# Patient Record
Sex: MALE | Race: WHITE | Employment: OTHER | URBAN - NONMETROPOLITAN AREA
[De-identification: names, ages, dates, MRNs, and addresses within clinical notes are randomized per-mention and may not be internally consistent; named-entity substitution may affect disease eponyms.]

---

## 2019-02-26 ENCOUNTER — HOSPITAL ENCOUNTER (OUTPATIENT)
Age: 64
Discharge: HOME | End: 2019-02-26
Payer: MEDICARE

## 2019-02-26 LAB
EKG ATRIAL RATE: 62 BPM
EKG P AXIS: 52 DEGREES
EKG P-R INTERVAL: 166 MS
EKG Q-T INTERVAL: 406 MS
EKG QRS DURATION: 84 MS
EKG QTC CALCULATION (BAZETT): 412 MS
EKG R AXIS: 54 DEGREES
EKG T AXIS: 57 DEGREES
EKG VENTRICULAR RATE: 62 BPM

## 2019-02-26 PROCEDURE — 93010 ELECTROCARDIOGRAM REPORT: CPT | Performed by: NUCLEAR MEDICINE

## 2019-02-26 PROCEDURE — 93005 ELECTROCARDIOGRAM TRACING: CPT | Performed by: FAMILY MEDICINE

## 2019-03-04 ENCOUNTER — OFFICE VISIT (OUTPATIENT)
Dept: CARDIOLOGY CLINIC | Age: 64
End: 2019-03-04
Payer: MEDICARE

## 2019-03-04 VITALS
HEIGHT: 70 IN | BODY MASS INDEX: 38.87 KG/M2 | HEART RATE: 62 BPM | WEIGHT: 271.5 LBS | SYSTOLIC BLOOD PRESSURE: 136 MMHG | DIASTOLIC BLOOD PRESSURE: 62 MMHG

## 2019-03-04 DIAGNOSIS — R06.00 DYSPNEA, UNSPECIFIED TYPE: Primary | ICD-10-CM

## 2019-03-04 DIAGNOSIS — I50.9 DYSPNEA DUE TO CONGESTIVE HEART FAILURE (HCC): ICD-10-CM

## 2019-03-04 PROCEDURE — G8417 CALC BMI ABV UP PARAM F/U: HCPCS | Performed by: INTERNAL MEDICINE

## 2019-03-04 PROCEDURE — 1123F ACP DISCUSS/DSCN MKR DOCD: CPT | Performed by: INTERNAL MEDICINE

## 2019-03-04 PROCEDURE — 1036F TOBACCO NON-USER: CPT | Performed by: INTERNAL MEDICINE

## 2019-03-04 PROCEDURE — 1101F PT FALLS ASSESS-DOCD LE1/YR: CPT | Performed by: INTERNAL MEDICINE

## 2019-03-04 PROCEDURE — 3017F COLORECTAL CA SCREEN DOC REV: CPT | Performed by: INTERNAL MEDICINE

## 2019-03-04 PROCEDURE — G8484 FLU IMMUNIZE NO ADMIN: HCPCS | Performed by: INTERNAL MEDICINE

## 2019-03-04 PROCEDURE — 99204 OFFICE O/P NEW MOD 45 MIN: CPT | Performed by: INTERNAL MEDICINE

## 2019-03-04 PROCEDURE — G8427 DOCREV CUR MEDS BY ELIG CLIN: HCPCS | Performed by: INTERNAL MEDICINE

## 2019-03-04 PROCEDURE — 4040F PNEUMOC VAC/ADMIN/RCVD: CPT | Performed by: INTERNAL MEDICINE

## 2019-03-04 RX ORDER — ATORVASTATIN CALCIUM 20 MG/1
20 TABLET, FILM COATED ORAL DAILY
Status: ON HOLD | COMMUNITY
End: 2019-03-12 | Stop reason: HOSPADM

## 2019-03-04 RX ORDER — AMLODIPINE BESYLATE AND BENAZEPRIL HYDROCHLORIDE 5; 10 MG/1; MG/1
1 CAPSULE ORAL DAILY
Status: ON HOLD | COMMUNITY
End: 2019-03-12 | Stop reason: HOSPADM

## 2019-03-05 ENCOUNTER — APPOINTMENT (OUTPATIENT)
Dept: GENERAL RADIOLOGY | Age: 64
DRG: 234 | End: 2019-03-05
Attending: INTERNAL MEDICINE
Payer: MEDICARE

## 2019-03-05 ENCOUNTER — APPOINTMENT (OUTPATIENT)
Dept: CARDIAC CATH/INVASIVE PROCEDURES | Age: 64
DRG: 234 | End: 2019-03-05
Attending: INTERNAL MEDICINE
Payer: MEDICARE

## 2019-03-05 ENCOUNTER — APPOINTMENT (OUTPATIENT)
Dept: CT IMAGING | Age: 64
DRG: 234 | End: 2019-03-05
Attending: INTERNAL MEDICINE
Payer: MEDICARE

## 2019-03-05 ENCOUNTER — APPOINTMENT (OUTPATIENT)
Dept: INTERVENTIONAL RADIOLOGY/VASCULAR | Age: 64
DRG: 234 | End: 2019-03-05
Attending: INTERNAL MEDICINE
Payer: MEDICARE

## 2019-03-05 PROBLEM — I25.10 CAD IN NATIVE ARTERY: Status: ACTIVE | Noted: 2019-03-05

## 2019-03-05 PROCEDURE — 93880 EXTRACRANIAL BILAT STUDY: CPT

## 2019-03-05 PROCEDURE — 71046 X-RAY EXAM CHEST 2 VIEWS: CPT

## 2019-03-05 PROCEDURE — 93971 EXTREMITY STUDY: CPT

## 2019-03-05 PROCEDURE — 71250 CT THORAX DX C-: CPT

## 2019-03-07 ENCOUNTER — ANESTHESIA EVENT (OUTPATIENT)
Dept: OPERATING ROOM | Age: 64
DRG: 234 | End: 2019-03-07
Payer: MEDICARE

## 2019-03-08 ENCOUNTER — ANESTHESIA (OUTPATIENT)
Dept: OPERATING ROOM | Age: 64
DRG: 234 | End: 2019-03-08
Payer: MEDICARE

## 2019-03-08 ENCOUNTER — APPOINTMENT (OUTPATIENT)
Dept: GENERAL RADIOLOGY | Age: 64
DRG: 234 | End: 2019-03-08
Attending: INTERNAL MEDICINE
Payer: MEDICARE

## 2019-03-08 VITALS
DIASTOLIC BLOOD PRESSURE: 70 MMHG | TEMPERATURE: 98.2 F | SYSTOLIC BLOOD PRESSURE: 121 MMHG | OXYGEN SATURATION: 99 % | RESPIRATION RATE: 10 BRPM

## 2019-03-08 PROBLEM — I21.4 NSTEMI (NON-ST ELEVATED MYOCARDIAL INFARCTION) (HCC): Status: ACTIVE | Noted: 2019-03-08

## 2019-03-08 PROCEDURE — 6370000000 HC RX 637 (ALT 250 FOR IP): Performed by: NURSE ANESTHETIST, CERTIFIED REGISTERED

## 2019-03-08 PROCEDURE — 2580000003 HC RX 258: Performed by: NURSE ANESTHETIST, CERTIFIED REGISTERED

## 2019-03-08 PROCEDURE — 6360000002 HC RX W HCPCS: Performed by: PHYSICIAN ASSISTANT

## 2019-03-08 PROCEDURE — P9045 ALBUMIN (HUMAN), 5%, 250 ML: HCPCS | Performed by: NURSE ANESTHETIST, CERTIFIED REGISTERED

## 2019-03-08 PROCEDURE — 6360000002 HC RX W HCPCS: Performed by: NURSE ANESTHETIST, CERTIFIED REGISTERED

## 2019-03-08 PROCEDURE — 71045 X-RAY EXAM CHEST 1 VIEW: CPT

## 2019-03-08 PROCEDURE — 2500000003 HC RX 250 WO HCPCS: Performed by: NURSE ANESTHETIST, CERTIFIED REGISTERED

## 2019-03-08 RX ORDER — CEFAZOLIN SODIUM 1 G/3ML
INJECTION, POWDER, FOR SOLUTION INTRAMUSCULAR; INTRAVENOUS PRN
Status: DISCONTINUED | OUTPATIENT
Start: 2019-03-08 | End: 2019-03-08 | Stop reason: SDUPTHER

## 2019-03-08 RX ORDER — FENTANYL CITRATE 50 UG/ML
INJECTION, SOLUTION INTRAMUSCULAR; INTRAVENOUS PRN
Status: DISCONTINUED | OUTPATIENT
Start: 2019-03-08 | End: 2019-03-08

## 2019-03-08 RX ORDER — FENTANYL CITRATE 50 UG/ML
INJECTION, SOLUTION INTRAMUSCULAR; INTRAVENOUS PRN
Status: DISCONTINUED | OUTPATIENT
Start: 2019-03-08 | End: 2019-03-08 | Stop reason: SDUPTHER

## 2019-03-08 RX ORDER — SODIUM CHLORIDE 9 MG/ML
INJECTION, SOLUTION INTRAVENOUS CONTINUOUS PRN
Status: DISCONTINUED | OUTPATIENT
Start: 2019-03-08 | End: 2019-03-08 | Stop reason: SDUPTHER

## 2019-03-08 RX ORDER — HEPARIN SODIUM 1000 [USP'U]/ML
INJECTION, SOLUTION INTRAVENOUS; SUBCUTANEOUS PRN
Status: DISCONTINUED | OUTPATIENT
Start: 2019-03-08 | End: 2019-03-08 | Stop reason: SDUPTHER

## 2019-03-08 RX ORDER — HYDROMORPHONE HCL 110MG/55ML
PATIENT CONTROLLED ANALGESIA SYRINGE INTRAVENOUS PRN
Status: DISCONTINUED | OUTPATIENT
Start: 2019-03-08 | End: 2019-03-08 | Stop reason: SDUPTHER

## 2019-03-08 RX ORDER — MIDAZOLAM HYDROCHLORIDE 1 MG/ML
INJECTION INTRAMUSCULAR; INTRAVENOUS PRN
Status: DISCONTINUED | OUTPATIENT
Start: 2019-03-08 | End: 2019-03-08 | Stop reason: SDUPTHER

## 2019-03-08 RX ORDER — PROTAMINE SULFATE 10 MG/ML
INJECTION, SOLUTION INTRAVENOUS PRN
Status: DISCONTINUED | OUTPATIENT
Start: 2019-03-08 | End: 2019-03-08 | Stop reason: SDUPTHER

## 2019-03-08 RX ORDER — PROPOFOL 10 MG/ML
INJECTION, EMULSION INTRAVENOUS PRN
Status: DISCONTINUED | OUTPATIENT
Start: 2019-03-08 | End: 2019-03-08 | Stop reason: SDUPTHER

## 2019-03-08 RX ORDER — TRANEXAMIC ACID 100 MG/ML
INJECTION, SOLUTION INTRAVENOUS PRN
Status: DISCONTINUED | OUTPATIENT
Start: 2019-03-08 | End: 2019-03-08 | Stop reason: SDUPTHER

## 2019-03-08 RX ORDER — ROCURONIUM BROMIDE 10 MG/ML
INJECTION, SOLUTION INTRAVENOUS PRN
Status: DISCONTINUED | OUTPATIENT
Start: 2019-03-08 | End: 2019-03-08 | Stop reason: SDUPTHER

## 2019-03-08 RX ORDER — ALBUMIN, HUMAN INJ 5% 5 %
SOLUTION INTRAVENOUS PRN
Status: DISCONTINUED | OUTPATIENT
Start: 2019-03-08 | End: 2019-03-08 | Stop reason: SDUPTHER

## 2019-03-08 RX ORDER — DEXMEDETOMIDINE HYDROCHLORIDE 4 UG/ML
INJECTION, SOLUTION INTRAVENOUS CONTINUOUS PRN
Status: DISCONTINUED | OUTPATIENT
Start: 2019-03-08 | End: 2019-03-08 | Stop reason: SDUPTHER

## 2019-03-08 RX ORDER — METOPROLOL TARTRATE 5 MG/5ML
INJECTION INTRAVENOUS PRN
Status: DISCONTINUED | OUTPATIENT
Start: 2019-03-08 | End: 2019-03-08 | Stop reason: SDUPTHER

## 2019-03-08 RX ORDER — LIDOCAINE HYDROCHLORIDE 20 MG/ML
INJECTION, SOLUTION INFILTRATION; PERINEURAL PRN
Status: DISCONTINUED | OUTPATIENT
Start: 2019-03-08 | End: 2019-03-08 | Stop reason: SDUPTHER

## 2019-03-08 RX ADMIN — PROTAMINE SULFATE 375 MG: 10 INJECTION, SOLUTION INTRAVENOUS at 12:17

## 2019-03-08 RX ADMIN — HEPARIN SODIUM 5000 UNITS: 1000 INJECTION, SOLUTION INTRAVENOUS; SUBCUTANEOUS at 10:01

## 2019-03-08 RX ADMIN — METOPROLOL TARTRATE 1 MG: 5 INJECTION, SOLUTION INTRAVENOUS at 09:10

## 2019-03-08 RX ADMIN — Medication 2 UNITS: at 08:36

## 2019-03-08 RX ADMIN — HYDROMORPHONE HYDROCHLORIDE 0.4 MG: 2 INJECTION INTRAMUSCULAR; INTRAVENOUS; SUBCUTANEOUS at 09:47

## 2019-03-08 RX ADMIN — ROCURONIUM BROMIDE 100 MG: 10 INJECTION INTRAVENOUS at 07:53

## 2019-03-08 RX ADMIN — PROPOFOL 150 MG: 10 INJECTION, EMULSION INTRAVENOUS at 07:53

## 2019-03-08 RX ADMIN — CEFAZOLIN 1500 MG: 1 INJECTION, POWDER, FOR SOLUTION INTRAMUSCULAR; INTRAVENOUS; PARENTERAL at 12:20

## 2019-03-08 RX ADMIN — ROCURONIUM BROMIDE 50 MG: 10 INJECTION INTRAVENOUS at 10:00

## 2019-03-08 RX ADMIN — Medication 2 UNITS/HR: at 08:30

## 2019-03-08 RX ADMIN — FENTANYL CITRATE 50 MCG: 50 INJECTION INTRAMUSCULAR; INTRAVENOUS at 08:23

## 2019-03-08 RX ADMIN — FENTANYL CITRATE 100 MCG: 50 INJECTION INTRAMUSCULAR; INTRAVENOUS at 08:35

## 2019-03-08 RX ADMIN — EPINEPHRINE 4 MCG/MIN: 1 INJECTION, SOLUTION INTRAMUSCULAR; INTRAVENOUS; SUBCUTANEOUS at 11:55

## 2019-03-08 RX ADMIN — MIDAZOLAM HYDROCHLORIDE 2 MG: 1 INJECTION, SOLUTION INTRAMUSCULAR; INTRAVENOUS at 11:50

## 2019-03-08 RX ADMIN — Medication 4 UNITS: at 12:30

## 2019-03-08 RX ADMIN — Medication 3 G: at 08:10

## 2019-03-08 RX ADMIN — TRANEXAMIC ACID 600 MG: 100 INJECTION, SOLUTION INTRAVENOUS at 12:17

## 2019-03-08 RX ADMIN — TRANEXAMIC ACID 1200 MG: 100 INJECTION, SOLUTION INTRAVENOUS at 09:30

## 2019-03-08 RX ADMIN — HEPARIN SODIUM 5000 UNITS: 1000 INJECTION, SOLUTION INTRAVENOUS; SUBCUTANEOUS at 08:39

## 2019-03-08 RX ADMIN — HYDROMORPHONE HYDROCHLORIDE 0.6 MG: 2 INJECTION INTRAMUSCULAR; INTRAVENOUS; SUBCUTANEOUS at 09:55

## 2019-03-08 RX ADMIN — FENTANYL CITRATE 100 MCG: 50 INJECTION INTRAMUSCULAR; INTRAVENOUS at 07:53

## 2019-03-08 RX ADMIN — MIDAZOLAM HYDROCHLORIDE 2 MG: 1 INJECTION, SOLUTION INTRAMUSCULAR; INTRAVENOUS at 07:53

## 2019-03-08 RX ADMIN — HYDROMORPHONE HYDROCHLORIDE 0.2 MG: 2 INJECTION INTRAMUSCULAR; INTRAVENOUS; SUBCUTANEOUS at 12:19

## 2019-03-08 RX ADMIN — HEPARIN SODIUM 35000 UNITS: 1000 INJECTION, SOLUTION INTRAVENOUS; SUBCUTANEOUS at 09:30

## 2019-03-08 RX ADMIN — METOPROLOL TARTRATE 2 MG: 5 INJECTION, SOLUTION INTRAVENOUS at 08:32

## 2019-03-08 RX ADMIN — METOPROLOL TARTRATE 2 MG: 5 INJECTION, SOLUTION INTRAVENOUS at 08:55

## 2019-03-08 RX ADMIN — HYDROMORPHONE HYDROCHLORIDE 0.4 MG: 2 INJECTION INTRAMUSCULAR; INTRAVENOUS; SUBCUTANEOUS at 12:50

## 2019-03-08 RX ADMIN — MIDAZOLAM HYDROCHLORIDE 1 MG: 1 INJECTION, SOLUTION INTRAMUSCULAR; INTRAVENOUS at 12:55

## 2019-03-08 RX ADMIN — DEXMEDETOMIDINE HYDROCHLORIDE 0.5 MCG/KG/HR: 4 INJECTION, SOLUTION INTRAVENOUS at 11:55

## 2019-03-08 RX ADMIN — SODIUM CHLORIDE: 9 INJECTION, SOLUTION INTRAVENOUS at 07:53

## 2019-03-08 RX ADMIN — ALBUMIN (HUMAN) 250 ML: 12.5 INJECTION, SOLUTION INTRAVENOUS at 12:39

## 2019-03-08 RX ADMIN — LIDOCAINE HYDROCHLORIDE 50 MG: 20 INJECTION, SOLUTION INFILTRATION; PERINEURAL at 07:53

## 2019-03-08 RX ADMIN — HYDROMORPHONE HYDROCHLORIDE 0.4 MG: 2 INJECTION INTRAMUSCULAR; INTRAVENOUS; SUBCUTANEOUS at 13:02

## 2019-03-08 RX ADMIN — Medication 5 UNITS: at 11:50

## 2019-03-08 ASSESSMENT — PULMONARY FUNCTION TESTS
PIF_VALUE: 22
PIF_VALUE: 22
PIF_VALUE: 25
PIF_VALUE: 26
PIF_VALUE: 26
PIF_VALUE: 21
PIF_VALUE: 1
PIF_VALUE: 24
PIF_VALUE: 1
PIF_VALUE: 27
PIF_VALUE: 26
PIF_VALUE: 2
PIF_VALUE: 25
PIF_VALUE: 20
PIF_VALUE: 21
PIF_VALUE: 24
PIF_VALUE: 1
PIF_VALUE: 22
PIF_VALUE: 22
PIF_VALUE: 24
PIF_VALUE: 26
PIF_VALUE: 17
PIF_VALUE: 21
PIF_VALUE: 28
PIF_VALUE: 26
PIF_VALUE: 23
PIF_VALUE: 1
PIF_VALUE: 22
PIF_VALUE: 21
PIF_VALUE: 26
PIF_VALUE: 21
PIF_VALUE: 21
PIF_VALUE: 27
PIF_VALUE: 20
PIF_VALUE: 22
PIF_VALUE: 26
PIF_VALUE: 1
PIF_VALUE: 21
PIF_VALUE: 26
PIF_VALUE: 1
PIF_VALUE: 22
PIF_VALUE: 25
PIF_VALUE: 21
PIF_VALUE: 26
PIF_VALUE: 26
PIF_VALUE: 27
PIF_VALUE: 22
PIF_VALUE: 1
PIF_VALUE: 1
PIF_VALUE: 23
PIF_VALUE: 23
PIF_VALUE: 21
PIF_VALUE: 21
PIF_VALUE: 1
PIF_VALUE: 20
PIF_VALUE: 24
PIF_VALUE: 22
PIF_VALUE: 21
PIF_VALUE: 26
PIF_VALUE: 1
PIF_VALUE: 24
PIF_VALUE: 23
PIF_VALUE: 23
PIF_VALUE: 28
PIF_VALUE: 24
PIF_VALUE: 21
PIF_VALUE: 24
PIF_VALUE: 1
PIF_VALUE: 1
PIF_VALUE: 26
PIF_VALUE: 24
PIF_VALUE: 1
PIF_VALUE: 21
PIF_VALUE: 22
PIF_VALUE: 21
PIF_VALUE: 2
PIF_VALUE: 22
PIF_VALUE: 27
PIF_VALUE: 1
PIF_VALUE: 22
PIF_VALUE: 21
PIF_VALUE: 23
PIF_VALUE: 20
PIF_VALUE: 26
PIF_VALUE: 1
PIF_VALUE: 26
PIF_VALUE: 22
PIF_VALUE: 27
PIF_VALUE: 22
PIF_VALUE: 1
PIF_VALUE: 24
PIF_VALUE: 21
PIF_VALUE: 1
PIF_VALUE: 1
PIF_VALUE: 20
PIF_VALUE: 27
PIF_VALUE: 1
PIF_VALUE: 24
PIF_VALUE: 21
PIF_VALUE: 26
PIF_VALUE: 1
PIF_VALUE: 22
PIF_VALUE: 20
PIF_VALUE: 24
PIF_VALUE: 26
PIF_VALUE: 1
PIF_VALUE: 20
PIF_VALUE: 22
PIF_VALUE: 26
PIF_VALUE: 1
PIF_VALUE: 24
PIF_VALUE: 27
PIF_VALUE: 1
PIF_VALUE: 28
PIF_VALUE: 21
PIF_VALUE: 1
PIF_VALUE: 28
PIF_VALUE: 28
PIF_VALUE: 29
PIF_VALUE: 23
PIF_VALUE: 23
PIF_VALUE: 1
PIF_VALUE: 24
PIF_VALUE: 1
PIF_VALUE: 21
PIF_VALUE: 1
PIF_VALUE: 23
PIF_VALUE: 23
PIF_VALUE: 22
PIF_VALUE: 21
PIF_VALUE: 1
PIF_VALUE: 23
PIF_VALUE: 21
PIF_VALUE: 26
PIF_VALUE: 22
PIF_VALUE: 24
PIF_VALUE: 1
PIF_VALUE: 22
PIF_VALUE: 27
PIF_VALUE: 1
PIF_VALUE: 23
PIF_VALUE: 1
PIF_VALUE: 17
PIF_VALUE: 23
PIF_VALUE: 22
PIF_VALUE: 23
PIF_VALUE: 26
PIF_VALUE: 1
PIF_VALUE: 1
PIF_VALUE: 23
PIF_VALUE: 22
PIF_VALUE: 22
PIF_VALUE: 27
PIF_VALUE: 24
PIF_VALUE: 21
PIF_VALUE: 24
PIF_VALUE: 1
PIF_VALUE: 26
PIF_VALUE: 27
PIF_VALUE: 21
PIF_VALUE: 29
PIF_VALUE: 1
PIF_VALUE: 24
PIF_VALUE: 26
PIF_VALUE: 0
PIF_VALUE: 28
PIF_VALUE: 26
PIF_VALUE: 24
PIF_VALUE: 24
PIF_VALUE: 1
PIF_VALUE: 1
PIF_VALUE: 22
PIF_VALUE: 23
PIF_VALUE: 0
PIF_VALUE: 1
PIF_VALUE: 27
PIF_VALUE: 1
PIF_VALUE: 23
PIF_VALUE: 21
PIF_VALUE: 24
PIF_VALUE: 1
PIF_VALUE: 21
PIF_VALUE: 26
PIF_VALUE: 23
PIF_VALUE: 24
PIF_VALUE: 2
PIF_VALUE: 21
PIF_VALUE: 20
PIF_VALUE: 21
PIF_VALUE: 26
PIF_VALUE: 1
PIF_VALUE: 24
PIF_VALUE: 26
PIF_VALUE: 23
PIF_VALUE: 21
PIF_VALUE: 21
PIF_VALUE: 1
PIF_VALUE: 21
PIF_VALUE: 1
PIF_VALUE: 26
PIF_VALUE: 26
PIF_VALUE: 22
PIF_VALUE: 21
PIF_VALUE: 1
PIF_VALUE: 21
PIF_VALUE: 25
PIF_VALUE: 24
PIF_VALUE: 31
PIF_VALUE: 1
PIF_VALUE: 1
PIF_VALUE: 24
PIF_VALUE: 23
PIF_VALUE: 24
PIF_VALUE: 23
PIF_VALUE: 23
PIF_VALUE: 26
PIF_VALUE: 20
PIF_VALUE: 24
PIF_VALUE: 1
PIF_VALUE: 21
PIF_VALUE: 20
PIF_VALUE: 1
PIF_VALUE: 26
PIF_VALUE: 21
PIF_VALUE: 1
PIF_VALUE: 23
PIF_VALUE: 26
PIF_VALUE: 25
PIF_VALUE: 22
PIF_VALUE: 1
PIF_VALUE: 22
PIF_VALUE: 20
PIF_VALUE: 22
PIF_VALUE: 1
PIF_VALUE: 26
PIF_VALUE: 22
PIF_VALUE: 1
PIF_VALUE: 23
PIF_VALUE: 1
PIF_VALUE: 1
PIF_VALUE: 27
PIF_VALUE: 21
PIF_VALUE: 24
PIF_VALUE: 26
PIF_VALUE: 28
PIF_VALUE: 22
PIF_VALUE: 20
PIF_VALUE: 21
PIF_VALUE: 21
PIF_VALUE: 18
PIF_VALUE: 27
PIF_VALUE: 22
PIF_VALUE: 26
PIF_VALUE: 1
PIF_VALUE: 29
PIF_VALUE: 22
PIF_VALUE: 1
PIF_VALUE: 21
PIF_VALUE: 25
PIF_VALUE: 23
PIF_VALUE: 1
PIF_VALUE: 28
PIF_VALUE: 1
PIF_VALUE: 26
PIF_VALUE: 22
PIF_VALUE: 24
PIF_VALUE: 1
PIF_VALUE: 23
PIF_VALUE: 1
PIF_VALUE: 23
PIF_VALUE: 24
PIF_VALUE: 22
PIF_VALUE: 23
PIF_VALUE: 21
PIF_VALUE: 1
PIF_VALUE: 25
PIF_VALUE: 27
PIF_VALUE: 25
PIF_VALUE: 24
PIF_VALUE: 25
PIF_VALUE: 23
PIF_VALUE: 22
PIF_VALUE: 17
PIF_VALUE: 28
PIF_VALUE: 22
PIF_VALUE: 22
PIF_VALUE: 1
PIF_VALUE: 27
PIF_VALUE: 1
PIF_VALUE: 25
PIF_VALUE: 1
PIF_VALUE: 23
PIF_VALUE: 1
PIF_VALUE: 22
PIF_VALUE: 24
PIF_VALUE: 1
PIF_VALUE: 1
PIF_VALUE: 26
PIF_VALUE: 26
PIF_VALUE: 19
PIF_VALUE: 26
PIF_VALUE: 1
PIF_VALUE: 26
PIF_VALUE: 21
PIF_VALUE: 21
PIF_VALUE: 22
PIF_VALUE: 28

## 2019-03-09 ENCOUNTER — APPOINTMENT (OUTPATIENT)
Dept: GENERAL RADIOLOGY | Age: 64
DRG: 234 | End: 2019-03-09
Attending: INTERNAL MEDICINE
Payer: MEDICARE

## 2019-03-09 PROCEDURE — 71045 X-RAY EXAM CHEST 1 VIEW: CPT

## 2019-03-10 ENCOUNTER — APPOINTMENT (OUTPATIENT)
Dept: GENERAL RADIOLOGY | Age: 64
DRG: 234 | End: 2019-03-10
Attending: INTERNAL MEDICINE
Payer: MEDICARE

## 2019-03-10 PROCEDURE — 71045 X-RAY EXAM CHEST 1 VIEW: CPT

## 2019-03-11 ENCOUNTER — APPOINTMENT (OUTPATIENT)
Dept: GENERAL RADIOLOGY | Age: 64
DRG: 234 | End: 2019-03-11
Attending: INTERNAL MEDICINE
Payer: MEDICARE

## 2019-03-11 PROCEDURE — 71046 X-RAY EXAM CHEST 2 VIEWS: CPT

## 2019-03-12 ENCOUNTER — APPOINTMENT (OUTPATIENT)
Dept: GENERAL RADIOLOGY | Age: 64
DRG: 234 | End: 2019-03-12
Attending: INTERNAL MEDICINE
Payer: MEDICARE

## 2019-03-12 PROCEDURE — 71045 X-RAY EXAM CHEST 1 VIEW: CPT

## 2019-03-26 ENCOUNTER — HOSPITAL ENCOUNTER (INPATIENT)
Age: 64
LOS: 1 days | Discharge: HOME | DRG: 299 | End: 2019-03-27
Attending: FAMILY MEDICINE | Admitting: INTERNAL MEDICINE
Payer: MEDICARE

## 2019-03-26 ENCOUNTER — APPOINTMENT (OUTPATIENT)
Dept: CT IMAGING | Age: 64
DRG: 299 | End: 2019-03-26
Payer: MEDICARE

## 2019-03-26 ENCOUNTER — APPOINTMENT (OUTPATIENT)
Dept: INTERVENTIONAL RADIOLOGY/VASCULAR | Age: 64
DRG: 299 | End: 2019-03-26
Payer: MEDICARE

## 2019-03-26 ENCOUNTER — TELEPHONE (OUTPATIENT)
Dept: CARDIOTHORACIC SURGERY | Age: 64
End: 2019-03-26

## 2019-03-26 ENCOUNTER — APPOINTMENT (OUTPATIENT)
Dept: GENERAL RADIOLOGY | Age: 64
DRG: 299 | End: 2019-03-26
Payer: MEDICARE

## 2019-03-26 DIAGNOSIS — I21.4 NSTEMI (NON-ST ELEVATED MYOCARDIAL INFARCTION) (HCC): ICD-10-CM

## 2019-03-26 DIAGNOSIS — I82.402 LEG DVT (DEEP VENOUS THROMBOEMBOLISM), ACUTE, LEFT (HCC): Primary | ICD-10-CM

## 2019-03-26 DIAGNOSIS — I25.10 CAD IN NATIVE ARTERY: ICD-10-CM

## 2019-03-26 DIAGNOSIS — R91.1 NODULE OF RIGHT LUNG: ICD-10-CM

## 2019-03-26 PROBLEM — I82.4Z2 LOWER LEG DVT (DEEP VENOUS THROMBOEMBOLISM), ACUTE, LEFT (HCC): Status: ACTIVE | Noted: 2019-03-26

## 2019-03-26 LAB
ALBUMIN SERPL-MCNC: 3.1 G/DL (ref 3.5–5.1)
ALP BLD-CCNC: 201 U/L (ref 38–126)
ALT SERPL-CCNC: 25 U/L (ref 11–66)
ANION GAP SERPL CALCULATED.3IONS-SCNC: 10 MEQ/L (ref 8–16)
APTT: 34.5 SECONDS (ref 22–38)
AST SERPL-CCNC: 22 U/L (ref 5–40)
BASOPHILS # BLD: 0.4 %
BASOPHILS ABSOLUTE: 0 THOU/MM3 (ref 0–0.1)
BILIRUB SERPL-MCNC: 0.6 MG/DL (ref 0.3–1.2)
BUN BLDV-MCNC: 18 MG/DL (ref 7–22)
CALCIUM SERPL-MCNC: 8.6 MG/DL (ref 8.5–10.5)
CHLORIDE BLD-SCNC: 105 MEQ/L (ref 98–111)
CO2: 26 MEQ/L (ref 23–33)
CREAT SERPL-MCNC: 1 MG/DL (ref 0.4–1.2)
EOSINOPHIL # BLD: 2.9 %
EOSINOPHILS ABSOLUTE: 0.2 THOU/MM3 (ref 0–0.4)
ERYTHROCYTE [DISTWIDTH] IN BLOOD BY AUTOMATED COUNT: 13.7 % (ref 11.5–14.5)
ERYTHROCYTE [DISTWIDTH] IN BLOOD BY AUTOMATED COUNT: 45.8 FL (ref 35–45)
GFR SERPL CREATININE-BSD FRML MDRD: 75 ML/MIN/1.73M2
GLUCOSE BLD-MCNC: 127 MG/DL (ref 70–108)
HCT VFR BLD CALC: 37.3 % (ref 42–52)
HEMOGLOBIN: 11.9 GM/DL (ref 14–18)
IMMATURE GRANS (ABS): 0.04 THOU/MM3 (ref 0–0.07)
IMMATURE GRANULOCYTES: 0.5 %
INR BLD: 1.14 (ref 0.85–1.13)
LYMPHOCYTES # BLD: 21.6 %
LYMPHOCYTES ABSOLUTE: 1.7 THOU/MM3 (ref 1–4.8)
MCH RBC QN AUTO: 29.4 PG (ref 26–33)
MCHC RBC AUTO-ENTMCNC: 31.9 GM/DL (ref 32.2–35.5)
MCV RBC AUTO: 92.1 FL (ref 80–94)
MONOCYTES # BLD: 8.5 %
MONOCYTES ABSOLUTE: 0.7 THOU/MM3 (ref 0.4–1.3)
NUCLEATED RED BLOOD CELLS: 0 /100 WBC
OSMOLALITY CALCULATION: 284.7 MOSMOL/KG (ref 275–300)
PLATELET # BLD: 291 THOU/MM3 (ref 130–400)
PMV BLD AUTO: 9.2 FL (ref 9.4–12.4)
POTASSIUM REFLEX MAGNESIUM: 4.5 MEQ/L (ref 3.5–5.2)
PRO-BNP: 716.9 PG/ML (ref 0–900)
RBC # BLD: 4.05 MILL/MM3 (ref 4.7–6.1)
SEG NEUTROPHILS: 66.1 %
SEGMENTED NEUTROPHILS ABSOLUTE COUNT: 5.1 THOU/MM3 (ref 1.8–7.7)
SODIUM BLD-SCNC: 141 MEQ/L (ref 135–145)
TOTAL PROTEIN: 6.9 G/DL (ref 6.1–8)
TROPONIN T: < 0.01 NG/ML
TROPONIN T: < 0.01 NG/ML
WBC # BLD: 7.7 THOU/MM3 (ref 4.8–10.8)

## 2019-03-26 PROCEDURE — 36415 COLL VENOUS BLD VENIPUNCTURE: CPT

## 2019-03-26 PROCEDURE — 87500 VANOMYCIN DNA AMP PROBE: CPT

## 2019-03-26 PROCEDURE — 2709999900 HC NON-CHARGEABLE SUPPLY

## 2019-03-26 PROCEDURE — 6360000002 HC RX W HCPCS: Performed by: FAMILY MEDICINE

## 2019-03-26 PROCEDURE — 6370000000 HC RX 637 (ALT 250 FOR IP): Performed by: INTERNAL MEDICINE

## 2019-03-26 PROCEDURE — 80053 COMPREHEN METABOLIC PANEL: CPT

## 2019-03-26 PROCEDURE — 2060000000 HC ICU INTERMEDIATE R&B

## 2019-03-26 PROCEDURE — 85025 COMPLETE CBC W/AUTO DIFF WBC: CPT

## 2019-03-26 PROCEDURE — 93971 EXTREMITY STUDY: CPT

## 2019-03-26 PROCEDURE — 71046 X-RAY EXAM CHEST 2 VIEWS: CPT

## 2019-03-26 PROCEDURE — 85730 THROMBOPLASTIN TIME PARTIAL: CPT

## 2019-03-26 PROCEDURE — 87081 CULTURE SCREEN ONLY: CPT

## 2019-03-26 PROCEDURE — 99285 EMERGENCY DEPT VISIT HI MDM: CPT

## 2019-03-26 PROCEDURE — 96365 THER/PROPH/DIAG IV INF INIT: CPT

## 2019-03-26 PROCEDURE — 84484 ASSAY OF TROPONIN QUANT: CPT

## 2019-03-26 PROCEDURE — 85610 PROTHROMBIN TIME: CPT

## 2019-03-26 PROCEDURE — 83880 ASSAY OF NATRIURETIC PEPTIDE: CPT

## 2019-03-26 PROCEDURE — 87641 MR-STAPH DNA AMP PROBE: CPT

## 2019-03-26 PROCEDURE — 93005 ELECTROCARDIOGRAM TRACING: CPT | Performed by: FAMILY MEDICINE

## 2019-03-26 PROCEDURE — 99223 1ST HOSP IP/OBS HIGH 75: CPT | Performed by: INTERNAL MEDICINE

## 2019-03-26 PROCEDURE — 71275 CT ANGIOGRAPHY CHEST: CPT

## 2019-03-26 PROCEDURE — 6360000004 HC RX CONTRAST MEDICATION: Performed by: FAMILY MEDICINE

## 2019-03-26 RX ORDER — HEPARIN SODIUM 1000 [USP'U]/ML
80 INJECTION, SOLUTION INTRAVENOUS; SUBCUTANEOUS PRN
Status: DISCONTINUED | OUTPATIENT
Start: 2019-03-26 | End: 2019-03-27

## 2019-03-26 RX ORDER — SODIUM CHLORIDE 0.9 % (FLUSH) 0.9 %
10 SYRINGE (ML) INJECTION EVERY 12 HOURS SCHEDULED
Status: DISCONTINUED | OUTPATIENT
Start: 2019-03-26 | End: 2019-03-27 | Stop reason: HOSPADM

## 2019-03-26 RX ORDER — HEPARIN SODIUM 1000 [USP'U]/ML
40 INJECTION, SOLUTION INTRAVENOUS; SUBCUTANEOUS PRN
Status: DISCONTINUED | OUTPATIENT
Start: 2019-03-26 | End: 2019-03-27

## 2019-03-26 RX ORDER — ACETAMINOPHEN 325 MG/1
650 TABLET ORAL EVERY 4 HOURS PRN
Status: DISCONTINUED | OUTPATIENT
Start: 2019-03-26 | End: 2019-03-27 | Stop reason: HOSPADM

## 2019-03-26 RX ORDER — ONDANSETRON 2 MG/ML
4 INJECTION INTRAMUSCULAR; INTRAVENOUS EVERY 6 HOURS PRN
Status: DISCONTINUED | OUTPATIENT
Start: 2019-03-26 | End: 2019-03-27 | Stop reason: HOSPADM

## 2019-03-26 RX ORDER — HEPARIN SODIUM 10000 [USP'U]/100ML
18 INJECTION, SOLUTION INTRAVENOUS CONTINUOUS
Status: DISCONTINUED | OUTPATIENT
Start: 2019-03-26 | End: 2019-03-27

## 2019-03-26 RX ORDER — HEPARIN SODIUM 1000 [USP'U]/ML
80 INJECTION, SOLUTION INTRAVENOUS; SUBCUTANEOUS ONCE
Status: COMPLETED | OUTPATIENT
Start: 2019-03-26 | End: 2019-03-26

## 2019-03-26 RX ORDER — POTASSIUM CHLORIDE 20 MEQ/1
20 TABLET, EXTENDED RELEASE ORAL 2 TIMES DAILY
Status: DISCONTINUED | OUTPATIENT
Start: 2019-03-26 | End: 2019-03-27 | Stop reason: HOSPADM

## 2019-03-26 RX ORDER — CLOPIDOGREL BISULFATE 75 MG/1
75 TABLET ORAL DAILY
Status: DISCONTINUED | OUTPATIENT
Start: 2019-03-27 | End: 2019-03-27

## 2019-03-26 RX ORDER — AMIODARONE HYDROCHLORIDE 200 MG/1
200 TABLET ORAL DAILY
Status: DISCONTINUED | OUTPATIENT
Start: 2019-03-27 | End: 2019-03-27

## 2019-03-26 RX ORDER — SODIUM CHLORIDE 0.9 % (FLUSH) 0.9 %
10 SYRINGE (ML) INJECTION PRN
Status: DISCONTINUED | OUTPATIENT
Start: 2019-03-26 | End: 2019-03-27 | Stop reason: HOSPADM

## 2019-03-26 RX ORDER — FUROSEMIDE 40 MG/1
40 TABLET ORAL DAILY
Status: DISCONTINUED | OUTPATIENT
Start: 2019-03-27 | End: 2019-03-27 | Stop reason: HOSPADM

## 2019-03-26 RX ORDER — ATORVASTATIN CALCIUM 40 MG/1
40 TABLET, FILM COATED ORAL NIGHTLY
Status: DISCONTINUED | OUTPATIENT
Start: 2019-03-26 | End: 2019-03-27 | Stop reason: HOSPADM

## 2019-03-26 RX ADMIN — ATORVASTATIN CALCIUM 40 MG: 40 TABLET, FILM COATED ORAL at 23:18

## 2019-03-26 RX ADMIN — POTASSIUM CHLORIDE 20 MEQ: 20 TABLET, EXTENDED RELEASE ORAL at 23:18

## 2019-03-26 RX ADMIN — HEPARIN SODIUM 9290 UNITS: 1000 INJECTION INTRAVENOUS; SUBCUTANEOUS at 20:29

## 2019-03-26 RX ADMIN — IOPAMIDOL 80 ML: 755 INJECTION, SOLUTION INTRAVENOUS at 21:31

## 2019-03-26 RX ADMIN — HEPARIN SODIUM 18 UNITS/KG/HR: 10000 INJECTION, SOLUTION INTRAVENOUS at 20:29

## 2019-03-26 ASSESSMENT — PAIN DESCRIPTION - FREQUENCY: FREQUENCY: CONTINUOUS

## 2019-03-26 ASSESSMENT — ENCOUNTER SYMPTOMS
BACK PAIN: 0
COUGH: 0
ABDOMINAL PAIN: 0
EYE REDNESS: 0
EYE DISCHARGE: 0
NAUSEA: 0
WHEEZING: 0
SHORTNESS OF BREATH: 0
RHINORRHEA: 0
DIARRHEA: 0
VOMITING: 0
SORE THROAT: 0

## 2019-03-26 ASSESSMENT — PAIN SCALES - GENERAL
PAINLEVEL_OUTOF10: 0
PAINLEVEL_OUTOF10: 5

## 2019-03-26 ASSESSMENT — PAIN DESCRIPTION - LOCATION: LOCATION: LEG

## 2019-03-26 ASSESSMENT — PAIN DESCRIPTION - PAIN TYPE: TYPE: ACUTE PAIN

## 2019-03-26 ASSESSMENT — PAIN DESCRIPTION - ORIENTATION: ORIENTATION: LEFT

## 2019-03-26 ASSESSMENT — PAIN DESCRIPTION - DESCRIPTORS: DESCRIPTORS: ACHING

## 2019-03-26 NOTE — ED PROVIDER NOTES
[facility]  eMERGENCY dEPARTMENT eNCOUnter          [address] [facility]       Chief Complaint   Patient presents with    Leg Pain     left       Nurses Notes reviewed and I agree except as noted inthe HPI. HISTORY OF PRESENT ILLNESS    Harshil Russo is a 75 y.o. male who presents to the Emergency Department for the evaluation of left leg pain. The patient has a history of CAD, CABG, NSTEMI, and hypertension. His open heart surgery was performed on 3/05/2019 by Dr. Aracely Moran. Patient developed this left calf pain 3 to 4 days ago. He reports here with concern of DVT. He is on both Plavix and Aspirin. He rates his current pain at a 5/10 in severity. He describes the pain as constant and aching in character. He has not noticed any swelling. He denies chest pain or shortness of breath. No cough or congestion. Patient reports an occasional cough which is chronic. He denies nausea, vomiting, fever, chills, or abdominal pain. Wife is at bedside. Patient is resting comfortably. There are no other complaints or symptoms. The HPI was provided by the patient. REVIEW OF SYSTEMS     Review of Systems   Constitutional: Negative for appetite change, chills, fatigue and fever. HENT: Negative for congestion, ear pain, rhinorrhea and sore throat. Eyes: Negative for discharge, redness and visual disturbance. Respiratory: Negative for cough, shortness of breath and wheezing. Cardiovascular: Negative for chest pain, palpitations and leg swelling. Gastrointestinal: Negative for abdominal pain, diarrhea, nausea and vomiting. Genitourinary: Negative for decreased urine volume, difficulty urinating, dysuria and hematuria. Musculoskeletal: Negative for back pain, joint swelling and neck pain. Left calf pain- concern of DVT   Skin: Negative for pallor and rash. Allergic/Immunologic: Negative for environmental allergies.    Neurological: Negative for dizziness, syncope, weakness, light-headedness, numbness and headaches. Hematological: Negative for adenopathy. Psychiatric/Behavioral: Negative for confusion and suicidal ideas. The patient is not nervous/anxious. PAST MEDICAL HISTORY    has no past medical history on file. SURGICAL HISTORY      has a past surgical history that includes Umbilical hernia repair; lipoma resection; and Coronary artery bypass graft (N/A, 3/8/2019). CURRENT MEDICATIONS       Previous Medications    ACETAMINOPHEN (TYLENOL) 325 MG TABLET    Take 2 tablets by mouth every 4 hours as needed for Pain    AMIODARONE (CORDARONE) 200 MG TABLET    Take 1 tablet by mouth daily    ATORVASTATIN (LIPITOR) 20 MG TABLET    Take 2 tablets by mouth nightly    CLOPIDOGREL (PLAVIX) 75 MG TABLET    Take 1 tablet by mouth daily    FUROSEMIDE (LASIX) 40 MG TABLET    Take 1 tablet by mouth daily    METOPROLOL TARTRATE (LOPRESSOR) 25 MG TABLET    Take 1 tablet by mouth 2 times daily    NITROGLYCERIN (NITROSTAT) 0.4 MG SL TABLET    up to max of 3 total doses. If no relief after 1 dose, call 911. POTASSIUM CHLORIDE (KLOR-CON M) 20 MEQ EXTENDED RELEASE TABLET    Take 1 tablet by mouth 2 times daily       ALLERGIES     has No Known Allergies. FAMILY HISTORY     indicated that the status of his mother is unknown. He indicated that the status of his father is unknown. He indicated that the status of his brother is unknown.   family history includes Heart Attack in his father; Heart Failure in his brother; Heart Surgery in his father; Hypertension in his father and mother. SOCIAL HISTORY      reports that he has never smoked. He has never used smokeless tobacco. He reports that he drinks alcohol. He reports that he does not use drugs. PHYSICAL EXAM     INITIAL VITALS:  weight is 256 lb (116.1 kg). His oral temperature is 98.8 °F (37.1 °C). His blood pressure is 143/73 (abnormal) and his pulse is 95.  His respiration is 20 and oxygen saturation is 94%. Physical Exam   Constitutional: He is oriented to person, place, and time. He appears well-developed and well-nourished. HENT:   Head: Normocephalic and atraumatic. Right Ear: External ear normal.   Left Ear: External ear normal.   Eyes: Conjunctivae are normal. Right eye exhibits no discharge. Left eye exhibits no discharge. No scleral icterus. Neck: Normal range of motion. Neck supple. No JVD present. Cardiovascular: Normal rate and regular rhythm. Pulmonary/Chest: Effort normal. No stridor. No respiratory distress. Abdominal: Soft. He exhibits no distension. Musculoskeletal: Normal range of motion. He exhibits no edema. Left lower leg: He exhibits no tenderness and no swelling. Neurological: He is alert and oriented to person, place, and time. He exhibits normal muscle tone. GCS eye subscore is 4. GCS verbal subscore is 5. GCS motor subscore is 6. Skin: Skin is warm and dry. He is not diaphoretic. No erythema. Psychiatric: He has a normal mood and affect. His behavior is normal.   Nursing note and vitals reviewed. DIFFERENTIAL DIAGNOSIS:       DIAGNOSTIC RESULTS     EKG: All EKG's are interpreted by the Emergency Department Physician who either signs or Co-signs this chart in the absence of acardiologist.    Vent. Rate: 81 bpm  MT interval: 160 ms  QRS duration:96 ms  QTc: 469 ms  P-R-T axes: 58, 71, 79  Normal sinus rhythm. Nonspecific T wave abnormality. Abnormal ECG. No STEMI  Compared to old EKG on 11-Mar-2019    Second EKG is unchanged ventricular rate 99 nonspecific EKG change. RADIOLOGY: non-plain film images(s) such as CT, Ultrasound and MRI are read by the radiologist.    VL DUP LOWER EXTREMITY VENOUS LEFT   Final Result   Extensive acute left lower extremity DVT as detailed above. Phil Gu Results discussed with Dr. Jimi Osorio on 3/26/2019 at 7:25 PM.      **This report has been created using voice recognition software.  It may contain minor errors which are inherent some pain. Venous Dopplers were done that revealed the patient has extensive left lower extremity DVT starting from the femoral vein distally. He will benefit from heparin and bridging to Coumadin and he will be admitted. Hemoglobin is good at 11.9 normal BUN and creatinine and normal electrolytes. Clinically he is stable. CONSULTS:    8:10 PM patient is handed over to the hospitalist service for admission. 900 pm when heparin was started patient became flushed and felt funny. We noted he is hypoxemic . Oxygen by NS started and patient is admitted. CTA chest is ordered and heparin stopped for now. FINAL IMPRESSION      1. Leg DVT (deep venous thromboembolism), acute, left [facility])          DISPOSITION/PLAN     Patient is admitted. PATIENT REFERRED TO:  Candelaria Cruz., DO  [address] [phone]            DISCHARGE MEDICATIONS:  New Prescriptions    No medications on file       (Please note that portions of this note were completed with a voice recognition program.Efforts were made to edit the dictations but occasionally words are mis-transcribed.)    Scribe:  Signed by: Travis Contreras, 3/26/195:47 PM Scribing for and in the presence of Marcus Vargas MD    Provider:  I personally performed the services described in the documentation, reviewed and edited the documentation which was dictated to the scribe in mypresence, and it accurately records my words and actions.     Marcus Vargas MD 3/26/19 9:04 PM                      Marcus Vargas MD  03/26/19 2010       Marcus Vargas MD  03/26/19 2772

## 2019-03-26 NOTE — ED NOTES
Patient to ED for left leg pain. Patient reports having CABG on the 8th of march. Patient states pain started Saturday in lower calf. Patient concerned of DVT.  Patient denies chest pain sob     Krystal Contreras RN  03/26/19 0352

## 2019-03-27 VITALS
OXYGEN SATURATION: 94 % | RESPIRATION RATE: 16 BRPM | DIASTOLIC BLOOD PRESSURE: 58 MMHG | TEMPERATURE: 99 F | SYSTOLIC BLOOD PRESSURE: 108 MMHG | HEART RATE: 79 BPM | BODY MASS INDEX: 36.52 KG/M2 | HEIGHT: 70 IN | WEIGHT: 255.1 LBS

## 2019-03-27 PROBLEM — R91.8 LUNG INFILTRATE: Status: ACTIVE | Noted: 2019-03-27

## 2019-03-27 PROBLEM — D64.9 ANEMIA: Status: ACTIVE | Noted: 2019-03-27

## 2019-03-27 PROBLEM — I82.402 LEG DVT (DEEP VENOUS THROMBOEMBOLISM), ACUTE, LEFT (HCC): Status: ACTIVE | Noted: 2019-03-26

## 2019-03-27 PROBLEM — R91.8 HILAR MASS: Status: ACTIVE | Noted: 2019-03-27

## 2019-03-27 PROBLEM — R91.1 NODULE OF LOWER LOBE OF RIGHT LUNG: Status: ACTIVE | Noted: 2019-03-27

## 2019-03-27 LAB
ALBUMIN SERPL-MCNC: 3.2 G/DL (ref 3.5–5.1)
ANION GAP SERPL CALCULATED.3IONS-SCNC: 12 MEQ/L (ref 8–16)
APTT: 113.4 SECONDS (ref 22–38)
APTT: 90.1 SECONDS (ref 22–38)
APTT: 98.1 SECONDS (ref 22–38)
BUN BLDV-MCNC: 17 MG/DL (ref 7–22)
CALCIUM SERPL-MCNC: 8.6 MG/DL (ref 8.5–10.5)
CHLORIDE BLD-SCNC: 102 MEQ/L (ref 98–111)
CO2: 25 MEQ/L (ref 23–33)
CREAT SERPL-MCNC: 1.1 MG/DL (ref 0.4–1.2)
EKG ATRIAL RATE: 81 BPM
EKG ATRIAL RATE: 99 BPM
EKG P AXIS: 58 DEGREES
EKG P AXIS: 58 DEGREES
EKG P-R INTERVAL: 148 MS
EKG P-R INTERVAL: 160 MS
EKG Q-T INTERVAL: 352 MS
EKG Q-T INTERVAL: 404 MS
EKG QRS DURATION: 88 MS
EKG QRS DURATION: 96 MS
EKG QTC CALCULATION (BAZETT): 451 MS
EKG QTC CALCULATION (BAZETT): 469 MS
EKG R AXIS: 71 DEGREES
EKG R AXIS: 79 DEGREES
EKG T AXIS: 159 DEGREES
EKG T AXIS: 79 DEGREES
EKG VENTRICULAR RATE: 81 BPM
EKG VENTRICULAR RATE: 99 BPM
ERYTHROCYTE [DISTWIDTH] IN BLOOD BY AUTOMATED COUNT: 13.7 % (ref 11.5–14.5)
ERYTHROCYTE [DISTWIDTH] IN BLOOD BY AUTOMATED COUNT: 45.1 FL (ref 35–45)
FERRITIN: 680 NG/ML (ref 22–322)
FOLATE: 12.3 NG/ML (ref 4.8–24.2)
GFR SERPL CREATININE-BSD FRML MDRD: 67 ML/MIN/1.73M2
GLUCOSE BLD-MCNC: 128 MG/DL (ref 70–108)
HCT VFR BLD CALC: 36.4 % (ref 42–52)
HEMOGLOBIN: 11.7 GM/DL (ref 14–18)
IRON: 32 UG/DL (ref 65–195)
MCH RBC QN AUTO: 29.5 PG (ref 26–33)
MCHC RBC AUTO-ENTMCNC: 32.1 GM/DL (ref 32.2–35.5)
MCV RBC AUTO: 91.7 FL (ref 80–94)
MRSA SCREEN RT-PCR: NEGATIVE
PHOSPHORUS: 2.8 MG/DL (ref 2.4–4.7)
PLATELET # BLD: 89 THOU/MM3 (ref 130–400)
PMV BLD AUTO: 9.1 FL (ref 9.4–12.4)
POTASSIUM SERPL-SCNC: 4 MEQ/L (ref 3.5–5.2)
PROCALCITONIN: 3.2 NG/ML (ref 0.01–0.09)
RBC # BLD: 3.97 MILL/MM3 (ref 4.7–6.1)
SODIUM BLD-SCNC: 139 MEQ/L (ref 135–145)
VANCOMYCIN RESISTANT ENTEROCOCCUS: NEGATIVE
VITAMIN B-12: 587 PG/ML (ref 211–911)
WBC # BLD: 13.9 THOU/MM3 (ref 4.8–10.8)

## 2019-03-27 PROCEDURE — 6370000000 HC RX 637 (ALT 250 FOR IP): Performed by: INTERNAL MEDICINE

## 2019-03-27 PROCEDURE — APPSS60 APP SPLIT SHARED TIME 46-60 MINUTES: Performed by: PHYSICIAN ASSISTANT

## 2019-03-27 PROCEDURE — 83540 ASSAY OF IRON: CPT

## 2019-03-27 PROCEDURE — 93010 ELECTROCARDIOGRAM REPORT: CPT | Performed by: INTERNAL MEDICINE

## 2019-03-27 PROCEDURE — 99223 1ST HOSP IP/OBS HIGH 75: CPT | Performed by: INTERNAL MEDICINE

## 2019-03-27 PROCEDURE — 6360000002 HC RX W HCPCS: Performed by: FAMILY MEDICINE

## 2019-03-27 PROCEDURE — 84145 PROCALCITONIN (PCT): CPT

## 2019-03-27 PROCEDURE — 85730 THROMBOPLASTIN TIME PARTIAL: CPT

## 2019-03-27 PROCEDURE — 2709999900 HC NON-CHARGEABLE SUPPLY

## 2019-03-27 PROCEDURE — 82746 ASSAY OF FOLIC ACID SERUM: CPT

## 2019-03-27 PROCEDURE — 82607 VITAMIN B-12: CPT

## 2019-03-27 PROCEDURE — 85027 COMPLETE CBC AUTOMATED: CPT

## 2019-03-27 PROCEDURE — 6360000002 HC RX W HCPCS: Performed by: INTERNAL MEDICINE

## 2019-03-27 PROCEDURE — 36415 COLL VENOUS BLD VENIPUNCTURE: CPT

## 2019-03-27 PROCEDURE — 99239 HOSP IP/OBS DSCHRG MGMT >30: CPT | Performed by: FAMILY MEDICINE

## 2019-03-27 PROCEDURE — 2580000003 HC RX 258: Performed by: INTERNAL MEDICINE

## 2019-03-27 PROCEDURE — 87040 BLOOD CULTURE FOR BACTERIA: CPT

## 2019-03-27 PROCEDURE — 94640 AIRWAY INHALATION TREATMENT: CPT

## 2019-03-27 PROCEDURE — 80069 RENAL FUNCTION PANEL: CPT

## 2019-03-27 PROCEDURE — 82728 ASSAY OF FERRITIN: CPT

## 2019-03-27 RX ORDER — IPRATROPIUM BROMIDE AND ALBUTEROL SULFATE 2.5; .5 MG/3ML; MG/3ML
1 SOLUTION RESPIRATORY (INHALATION)
Status: DISCONTINUED | OUTPATIENT
Start: 2019-03-27 | End: 2019-03-27 | Stop reason: HOSPADM

## 2019-03-27 RX ORDER — LEVOFLOXACIN 500 MG/1
500 TABLET, FILM COATED ORAL DAILY
Qty: 7 TABLET | Refills: 0 | Status: SHIPPED | OUTPATIENT
Start: 2019-03-27 | End: 2019-04-02

## 2019-03-27 RX ORDER — LEVOFLOXACIN 5 MG/ML
500 INJECTION, SOLUTION INTRAVENOUS EVERY 24 HOURS
Status: DISCONTINUED | OUTPATIENT
Start: 2019-03-27 | End: 2019-03-27 | Stop reason: HOSPADM

## 2019-03-27 RX ADMIN — HEPARIN SODIUM 18.6 ML/HR: 10000 INJECTION, SOLUTION INTRAVENOUS at 11:09

## 2019-03-27 RX ADMIN — LEVOFLOXACIN 500 MG: 5 INJECTION, SOLUTION INTRAVENOUS at 03:36

## 2019-03-27 RX ADMIN — METOPROLOL TARTRATE 25 MG: 25 TABLET, FILM COATED ORAL at 02:37

## 2019-03-27 RX ADMIN — FUROSEMIDE 40 MG: 40 TABLET ORAL at 08:37

## 2019-03-27 RX ADMIN — METOPROLOL TARTRATE 25 MG: 25 TABLET, FILM COATED ORAL at 08:37

## 2019-03-27 RX ADMIN — Medication 10 ML: at 08:38

## 2019-03-27 RX ADMIN — POTASSIUM CHLORIDE 20 MEQ: 20 TABLET, EXTENDED RELEASE ORAL at 08:37

## 2019-03-27 RX ADMIN — Medication 10 ML: at 00:00

## 2019-03-27 RX ADMIN — CEFTRIAXONE SODIUM 1 G: 1 INJECTION, POWDER, FOR SOLUTION INTRAMUSCULAR; INTRAVENOUS at 03:34

## 2019-03-27 RX ADMIN — IPRATROPIUM BROMIDE AND ALBUTEROL SULFATE 1 AMPULE: .5; 3 SOLUTION RESPIRATORY (INHALATION) at 10:31

## 2019-03-27 ASSESSMENT — PAIN SCALES - GENERAL: PAINLEVEL_OUTOF10: 0

## 2019-03-27 NOTE — CARE COORDINATION
3/27/19, 2:15 PM    Home with wife. Denies needs, declines HH. Discharge plan discussed by  and . Discharge plan reviewed with patient/ family. Patient/ family verbalize understanding of discharge plan and are in agreement with plan. Understanding was demonstrated using the teach back method.        IMM Letter  IMM Letter given to Patient/Family/Significant other/Guardian/POA/by[de-identified] Staff  IMM Letter date given[de-identified] 03/26/19  IMM Letter time given[de-identified] 2046

## 2019-03-27 NOTE — CONSULTS
CT/CV Surgery Consult Note    3/27/2019 9:30 AM  Surgeon:  Dr. Traci Koehler     Reason for Consult: DVT    HPI:    Mr. Bartolo Pineda  Is a 76 yo male with a PMH including CAD, NSTEMI, and HTN. Surgical hx includes recent CABG x 3 on 19. The pt had an unremarkable and progressive convalescence without adverse events and was discharged from the hospital on 19. The pt presented to the ED yesterday for evaluation of left calf pain x 3/4 days and increase in leg swelling. He described the pain as a constant aching pain. Pt was diagnosed with an extensive acute left lower extremity DVT and admitted. Today, the pt reports improvement in pain and swelling. In regards to recent CABG, the pt is stable incisions are healing appropriately and sternum is stable. Vital Signs: /77   Pulse 81   Temp 99 °F (37.2 °C) (Oral)   Resp 18   Ht 5' 10\" (1.778 m)   Wt 255 lb 1.6 oz (115.7 kg)   SpO2 97%   BMI 36.60 kg/m²    Temp (24hrs), Av °F (37.2 °C), Min:98.8 °F (37.1 °C), Max:99.2 °F (37.3 °C)      PULSE OXIMETRY RANGE: SpO2  Av.3 %  Min: 84 %  Max: 100 %    SUPPLEMENTAL O2: O2 Flow Rate (L/min): 1 L/min     Labs:   CBC:     Recent Labs     19  0141 19  0448   WBC 7.7 13.9*  --    HGB 11.9* 11.7*  --    HCT 37.3* 36.4*  --    MCV 92.1 91.7  --     89*  --    APTT 34.5 90.1* 113.4*   INR 1.14*  --   --      BMP:   Recent Labs     19  014    139   K 4.5 4.0    102   CO2 26 25   PHOS  --  2.8   BUN 18 17   CREATININE 1.0 1.1     Last HgA1C:   Lab Results   Component Value Date    LABA1C 5.4 2019       Imaging:  CTA chest: I have reviewed the CTA image.    Stable postoperative sternotomy changes are present. The aortic arch, great vessels, and mediastinal structures remain within normal limits. There is no acute dissection. There is redemonstration of a small nodule in the right lower lobe measuring    approximately 8.9 mm.  There is no of motion without deformity. No calf tenderness noted. Skin: Skin color, texture, turgor normal.  No rashes or lesions. Neurologic:  Neurovascularly intact without any focal sensory/motor deficits. Psychiatric:  Alert and oriented, thought content appropriate, normal insight. Assessment:   Patient Active Problem List   Diagnosis    Dyspnea on exertion    Shortness of breath    Hypertension    CAD in native artery    NSTEMI (non-ST elevated myocardial infarction) ([facility] [address]    Lower leg DVT (deep venous thromboembolism), acute, left (HCC)    Anemia    Lung infiltrate    Hilar mass       Plan: 3/27/19  1. In regards to recent CABG the pt is clinically stable. Will stop amiodarone. 2. Recommend d/c'ing Plavix and starting Eliquis and ASA at d/c for DVT. 3. Close follow up with repeat LE duplex  4. Will continue to follow pt in the hospital       The plan of care was discussed in detail with Dr. Alberta Duncan    Issues discussed in detail with the patient, who understands and has no further questions. Time spent with patient: 80 minutes, of which more than 50% was spent counseling/coordinating the patient's care.     Linette Ramos PA-C

## 2019-03-27 NOTE — ED NOTES
Patient transported to Radiology department via [employer] tech in stable condition.        Mikayla Meehan RN  03/26/19 2092

## 2019-03-27 NOTE — PROGRESS NOTES
All discharge instructions reviewed with patient and wife. All questions answered. 2 IV's removed no complications. Tele removed    4 chest tube sutures removed, no complications    2 paper prescriptions given.

## 2019-03-27 NOTE — CARE COORDINATION
3/27/19, 7:51 AM      [place]       Admitted from: ED 3/26/2019/ [address] [facility] day: 1   Location: -11/011-A Reason for admit: Lower leg DVT (deep venous thromboembolism), acute, left (HCC) [I82.4Z2] Status: IP  Admit order signed?: yes  PMH:  has no past medical history on file. Procedure: none  Pertinent abnormal Imaging:  3/26 LLE Venous doppler: Extensive acute LLE DVT  3/26 CTA Chest: Stable appearing R hilar lung mass; Developing left upper lobe atelectasis and/or infiltrate. There is noted small left effusion  3/27 CXR: Posterior left lower lobe pneumonia or atelectasis and possible small left pleural effusion  Medications:  Scheduled Meds:   cefTRIAXone (ROCEPHIN) IV  1 g Intravenous Q24H    levofloxacin  500 mg Intravenous Q24H    ipratropium-albuterol  1 ampule Inhalation Q4H WA    amiodarone  200 mg Oral Daily    atorvastatin  40 mg Oral Nightly    clopidogrel  75 mg Oral Daily    furosemide  40 mg Oral Daily    metoprolol tartrate  25 mg Oral BID    potassium chloride  20 mEq Oral BID    sodium chloride flush  10 mL Intravenous 2 times per day     Continuous Infusions:   heparin (porcine) 16 Units/kg/hr (03/27/19 0610)      Pertinent Info/Orders/Treatment Plan: Presented with c/o LLE calf pain x 4 days, worse with ambulation. Hx of CABG on 3/8/2019. On asa and plavix at home. Found to have LLE DVT and started on heparin drip. Pulmonology and CVS consulted. On room air. Tmax 99.2. Ox4. Telemetry, up with assist. Heparin drip, amio, lipitor, IV rocephin, plavix, po lasix 40 mg daily, nebs, IV levaquin, lopressor, K+. Procal 3.2, Trops neg, alb 3.2, alk phos 201, wbc 7.7 - now 13.9, Hgb 11.7, plt 291 -now 89, ferritin 680, iron 32, INR 1.14. Blood culture sent. Diet: DIET CARDIAC;   Smoking status:  reports that he has never smoked.  He has never used smokeless tobacco.   PCP: Deepthi Nugent DO  Readmission: yes  Patient has been readmitted within 14 days s/p IP stay 3/5 - 3/12

## 2019-03-27 NOTE — ED NOTES
Patient reassessed, denies chest pain or SOB at this time, patient states he feels very cold and is shaking, dr Roxanne Strauss updated, awaiting cta resulted      Winnie Melton RN  03/26/19 3598

## 2019-03-27 NOTE — FLOWSHEET NOTE
03/27/19 0090   Provider Notification   Reason for Communication Evaluate   Provider Name Dr. Courtney Ward   Provider Notification Physician   Method of Communication Secure Message   Response Waiting for response   Notification Time 0629     Secure message: 4B 11. Inpatient consult to vascular surgery for extensive left leg DVT. Patient had four vessel CABG on 3/8/19. Heparin gtt infusing.  May I add this patient to your list?

## 2019-03-27 NOTE — ED NOTES
Reassessment of the patients Leg Pain (left)   is unchanged, the patients pain reassessment is a 5/10, Side rails up times 2, call light in reach, will continue to monitor.      Delano Mancera RN  03/26/19 2033

## 2019-03-27 NOTE — H&P
3/12/19  Yes Princess Vaughn PA-C   amiodarone (CORDARONE) 200 MG tablet Take 1 tablet by mouth daily 3/12/19  Yes JOSHUA WallaceC   furosemide (LASIX) 40 MG tablet Take 1 tablet by mouth daily 3/12/19  Yes JOSHUA WallaceC   potassium chloride (KLOR-CON M) 20 MEQ extended release tablet Take 1 tablet by mouth 2 times daily 3/12/19  Yes Princess Vaughn PA-C   nitroGLYCERIN (NITROSTAT) 0.4 MG SL tablet up to max of 3 total doses. If no relief after 1 dose, call 911. 3/12/19   Princess Vaughn PA-C       Allergies:  Patient has no known allergies. Social History:      The patient currently lives at home    TOBACCO:   reports that he has never smoked. He has never used smokeless tobacco.  ETOH:   reports that he drinks alcohol. Family History:      Reviewed in detail and negative for DM, Cancer, CVA. Positive as follows:        Problem Relation Age of Onset    Hypertension Mother     Heart Surgery Father     Heart Attack Father     Hypertension Father     Heart Failure Brother        Diet:  DIET CARDIAC;    REVIEW OF SYSTEMS:   Pertinent positives as noted in the HPI. All other systems reviewed and negative. PHYSICAL EXAM:    BP (!) 131/59   Pulse 106   Temp 99.2 °F (37.3 °C) (Oral)   Resp 20   Ht 5' 10\" (1.778 m)   Wt 255 lb 3.2 oz (115.8 kg)   SpO2 98%   BMI 36.62 kg/m²     General appearance:  No apparent distress, appears stated age and cooperative. HEENT:  Normal cephalic, atraumatic without obvious deformity. Pupils equal, round, and reactive to light. Extra ocular muscles intact. Conjunctivae/corneas clear. Neck: Supple, with full range of motion. No jugular venous distention. Trachea midline. Respiratory:  Normal respiratory effort. Clear to auscultation, bilaterally without Rales/Wheezes/Rhonchi. Cardiovascular:  Regular rate and rhythm with normal S1/S2 without rubs or gallops. Abdomen: Soft, non-tender, non-distended with normal bowel sounds.   Musculoskeletal:  No clubbing, lobe pneumonia or atelectasis and possible small left pleural effusion. **This report has been created using voice recognition software. It may contain minor errors which are inherent in voice recognition technology. **      Final report electronically signed by Dr. Mago Giles on 3/26/2019 6:19 PM      XR CHEST STANDARD (2 VW)    (Results Pending)            DVT prophylaxis: [] Lovenox                                 [] SCDs                                 [x] Heparin                                 [] Encourage ambulation           [] Already on Anticoagulation    Code Status: Full Code      PT/OT Eval Status: Encouraged if warranted    Disposition:    [x] Home       [] TCU       [] Rehab       [] Psych       [] SNF       [] [place]       [] Other-    ASSESSMENT:    Active Hospital Problems    Diagnosis Date Noted    Lower leg DVT (deep venous thromboembolism), acute, left ([address] [I82.4Z2] 03/26/2019     Priority: High    Lung infiltrate [R91.8] 03/27/2019     Priority: Medium    Anemia [D64.9] 03/27/2019     Priority: Low    Hilar mass [R91.8] 03/27/2019    CAD in native artery [I25.10] 03/05/2019    Hypertension [I10]        PLAN:    Admit to Telemetry Unit  Diet cardiac  Analgesics PRN  Antiemetics PRN  DVT prophylaxis  PT/OT  IS  Consult vascular surgery, apprec chart recs  Heparin infusion  Empiric antibiotics  Pulm consult, hilar mass  Duonebs Q4 w/a  Will need to re-evaluate home medications in morning  Continue to monitor closely          Thank you Jairo Sears DO for the opportunity to be involved in this patient's care.     Electronically signed by Carmela Baires DO on 3/27/2019 at 1:39 AM

## 2019-03-27 NOTE — DISCHARGE SUMMARY
Hospital Medicine Discharge Summary      Patient Identification:   Tarun Cobian   : 1955  MRN: [id number]   Account: [de-identified]      Patient's PCP: Myrna Bird DO    Admit Date: 3/26/2019     Discharge Date:   3/27/2019    Admitting Physician: Toro Rose DO     Discharge Physician: Robyn Frederick MD     Discharge Diagnoses: Active Hospital Problems    Diagnosis Date Noted    Anemia [D64.9] 2019    Lung infiltrate [R91.8] 2019    Hilar mass [R91.8] 2019    Lower leg DVT (deep venous thromboembolism), acute, left ([address] [I82.4Z2] 2019    CAD in native artery [I25.10] 2019    Hypertension [I10]        The patient was seen and examined on day of discharge and this discharge summary is in conjunction with any daily progress note from day of discharge. Hospital Course:   1) Left DVT  - Seen by CTS  - Pt has been taken off Plavix  - Treated with heparin gtt  - Pt placed on Eliquis and ASA    2) CABG recent  - Amiodarone d/c  - Pt was continued on Lipitor, metoprolol and ASA    3) Hilar Mass  - Pulm consulted    4) Presumptive PNA  - Pt placed on Levaquin and will be sent with oral Levaquin at discharge. Exam:     Vitals:  Vitals:    19 0400 19 0404 19 0822 19 1123   BP:   137/77 (!) 108/58   Pulse:   81 79   Resp:   18 16   Temp:   99 °F (37.2 °C) 99 °F (37.2 °C)   TempSrc:   Oral Oral   SpO2: 94%  97% 94%   Weight:  255 lb 1.6 oz (115.7 kg)     Height:         Weight: Weight: 255 lb 1.6 oz (115.7 kg)     24 hour intake/output:    Intake/Output Summary (Last 24 hours) at 3/27/2019 1409  Last data filed at 3/27/2019 0403  Gross per 24 hour   Intake 1190 ml   Output 475 ml   Net 715 ml         General appearance:  No apparent distress, appears stated age and cooperative. HEENT:  Normal cephalic, atraumatic without obvious deformity. Pupils equal, round, and reactive to light. Extra ocular muscles intact.  Conjunctivae/corneas using voice recognition software. It may contain minor errors which are inherent in voice recognition technology. **      Final report electronically signed by Dr. Jose Astorga on 3/26/2019 7:28 PM      XR CHEST STANDARD (2 VW)   Final Result      Posterior left lower lobe pneumonia or atelectasis and possible small left pleural effusion. **This report has been created using voice recognition software. It may contain minor errors which are inherent in voice recognition technology. **      Final report electronically signed by Dr. Jose Astorga on 3/26/2019 6:19 PM      XR CHEST STANDARD (2 VW)    (Results Pending)          Consults:     IP CONSULT TO VASCULAR SURGERY  IP CONSULT TO PULMONOLOGY    Disposition: Home  Condition at Discharge: Stable    Code Status:  Full Code     Patient Instructions:    Discharge lab work: Repeat US of LLE in 2 weeks -- Follow up with CTS  Activity: activity as tolerated  Diet: DIET CARDIAC; Follow-up visits:   Angeline Trent DO  [facility]  [phone]               Discharge Medications:      Rod Jimenez   Home Medication Instructions [id number]    Printed on:03/27/19 4939   Medication Information                      acetaminophen (TYLENOL) 325 MG tablet  Take 2 tablets by mouth every 4 hours as needed for Pain             apixaban (ELIQUIS) 5 MG TABS tablet  Take 1 tablet by mouth 2 times daily 10 mg PO BID X 7 days then 5 mg PO BID             atorvastatin (LIPITOR) 20 MG tablet  Take 2 tablets by mouth nightly             furosemide (LASIX) 40 MG tablet  Take 1 tablet by mouth daily             levofloxacin (LEVAQUIN) 500 MG tablet  Take 1 tablet by mouth daily for 6 days             metoprolol tartrate (LOPRESSOR) 25 MG tablet  Take 1 tablet by mouth 2 times daily             nitroGLYCERIN (NITROSTAT) 0.4 MG SL tablet  up to max of 3 total doses. If no relief after 1 dose, call 911.              potassium chloride (KLOR-CON M) 20 MEQ extended release tablet  Take 1 tablet by mouth 2 times daily                 Time Spent on discharge is more than 30 minutes in the examination, evaluation, counseling and review of medications and discharge plan. Signed: Thank you Miguel Dias DO for the opportunity to be involved in this patient's care.     Electronically signed by Apoorva Granados MD on 3/27/2019 at 2:09 PM

## 2019-03-27 NOTE — CONSULTS
[facility]    Patient - Ning Chen   MRN -  [id number]   [facility]   - 1955      Date of Admission -  3/26/2019  5:38 PM  Date of evaluation -  3/27/2019  Room - 4B-011-A   Hospital Day - 1  Consulting - Angelia Avalos MD Primary Care Physician - Lewis Lomeli DO   Chief Complaint   Right hilar mass  Active Hospital Problem List      Active Hospital Problems    Diagnosis Date Noted    Anemia [D64.9] 2019    Lung infiltrate [R91.8] 2019    Hilar mass [R91.8] 2019    Lower leg DVT (deep venous thromboembolism), acute, left ([address] [I82.4Z2] 2019    CAD in native artery [I25.10] 2019    Hypertension [I10]      HPI   Ning Chen is a 75 y.o. male admitted for LLE DVT after presenting to the ED with left calf swelling and pain. Josh Mancera had CABG on 3/8/19  CT-A chest did not reveal PE, stable RLL 8.9 mm nodule a new area (comparing to pre-op CT) of atelectasis in RML and small pleural effusion on the left. Dr Savilla Bence the reading radiologist describes a right hilar mass, but reviewing the images I did not see it, [facility] from radiology reviewed films with me and he believes the reading is probably a mistake. Josh Mancera s a never smoker    Past Medical History   History reviewed. No pertinent past medical history. Past Surgical History           Procedure Laterality Date    CARDIAC SURGERY      CORONARY ARTERY BYPASS GRAFT N/A 3/8/2019    CABG CORONARY ARTERY BYPASS X 4 WITH PAULA performed by Terrance Walton MD at [address]      rt leg    UMBILICAL HERNIA REPAIR      VASCULAR SURGERY       Diet    DIET CARDIAC; Allergies    Patient has no known allergies.   Social History     Social History     Socioeconomic History    Marital status:      Spouse name: Ted Hutchins Number of children: Not on file    Years of education: Not on file    Highest education level: Not  cefTRIAXone (ROCEPHIN) IV  1 g Intravenous Q24H    levofloxacin  500 mg Intravenous Q24H    ipratropium-albuterol  1 ampule Inhalation Q4H WA    atorvastatin  40 mg Oral Nightly    clopidogrel  75 mg Oral Daily    furosemide  40 mg Oral Daily    metoprolol tartrate  25 mg Oral BID    potassium chloride  20 mEq Oral BID    sodium chloride flush  10 mL Intravenous 2 times per day     heparin (porcine), heparin (porcine), acetaminophen, sodium chloride flush, magnesium hydroxide, ondansetron  IV Drips/Infusions   heparin (porcine) 16 Units/kg/hr (03/27/19 0610)     Vitals    Vitals    height is 5' 10\" (1.778 m) and weight is 255 lb 1.6 oz (115.7 kg). His oral temperature is 99 °F (37.2 °C). His blood pressure is 137/77 and his pulse is 81. His respiration is 18 and oxygen saturation is 97%. O2 Flow Rate (L/min): 1 L/min  I/O    Intake/Output Summary (Last 24 hours) at 3/27/2019 1043  Last data filed at 3/27/2019 0403  Gross per 24 hour   Intake 1190 ml   Output 475 ml   Net 715 ml     Patient Vitals for the past 96 hrs (Last 3 readings):   Weight   03/27/19 0404 255 lb 1.6 oz (115.7 kg)   03/26/19 2253 255 lb 3.2 oz (115.8 kg)   03/26/19 1942 256 lb (116.1 kg)     Exam   Constitutional: Patient appears moderately built and moderately nourished. Head: Normocephalic and atraumatic. Mouth/Throat: Oropharynx is clear and moist.  No oral thrush. Eyes: Conjunctivae are normal. Pupils are equal, round, and reactive to light. No scleral icterus. Neck: Neck supple. No JVD or tracheal deviation present. Cardiovascular: Regular rate, regular rhythm, S1 and S2 with no murmur. No peripheral edema  Pulmonary/Chest: Normal effort with clear breath sounds. No stridor. No respiratory distress. Abdominal: Soft. Bowel sounds audible. No distension or tenderness to palp  Musculoskeletal: Both LE in compression stocking tenderness L calf  Lymphadenopathy:  No cervical adenopathy.    Neurological: Patient is alert and oriented to person, place, and time. Skin: Skin is warm and dry. Labs   ABG  Lab Results   Component Value Date    PH 7.30 03/08/2019    PO2 93 03/08/2019    PCO2 47 03/08/2019    HCO3 23 03/08/2019    O2SAT 96 03/08/2019     Lab Results   Component Value Date    IFIO2 40 03/08/2019    MODE CPAP/PS 03/08/2019    SETTIDVOL 580 03/08/2019    SETPEEP 5.0 03/08/2019     CBC  Recent Labs     03/26/19 1854 03/27/19  0141   WBC 7.7 13.9*   RBC 4.05* 3.97*   HGB 11.9* 11.7*   HCT 37.3* 36.4*   MCV 92.1 91.7   MCH 29.4 29.5   MCHC 31.9* 32.1*    89*   MPV 9.2* 9.1*      BMP  Recent Labs     03/26/19 1854 03/27/19  0141    139   K 4.5 4.0    102   CO2 26 25   BUN 18 17   CREATININE 1.0 1.1   GLUCOSE 127* 128*   PHOS  --  2.8   CALCIUM 8.6 8.6     LFT  Recent Labs     03/26/19 1854   AST 22   ALT 25   BILITOT 0.6   ALKPHOS 201*     TROP  Lab Results   Component Value Date    TROPONINT < 0.010 03/26/2019    TROPONINT < 0.010 03/26/2019     BNP  Lab Results   Component Value Date    PROBNP 716.9 03/26/2019     D-Dimer  No results found for: DDIMER  Lactic Acid  No results for input(s): LACTA in the last 72 hours. INR  Recent Labs     03/26/19 1854   INR 1.14*     PTT  Recent Labs     03/26/19 1854 03/27/19  0141 03/27/19  0448   APTT 34.5 90.1* 113.4*     Glucose  No results for input(s): POCGLU in the last 72 hours. UA No results for input(s): SPECGRAV, PHUR, COLORU, CLARITYU, MUCUS, PROTEINU, BLOODU, RBCUA, WBCUA, BACTERIA, NITRU, GLUCOSEU, BILIRUBINUR, UROBILINOGEN, KETUA, LABCAST, LABCASTTY, AMORPHOS in the last 72 hours. Invalid input(s): CRYSTALS. PFTs   N/A  Echo    Conclusions      Summary   Technically difficult examination.   Normal left ventricle size and systolic function. Ejection fraction was   estimated at 55-60%.  There were no regional left ventricular wall motion   abnormalities and wall thickness was within normal limits.   The right ventricular size was normal with normal systolic function and   wall thickness.      Signature      ----------------------------------------------------------------   Electronically signed by Cleotilde Aschoff MD (Interpreting   [id number]) on 03/05/2019 at 08:59 AM  Cultures    Procalcitonin  Lab Results   Component Value Date    PROCAL 3.20 03/27/2019       Radiology    CXR    3/26/19  FINDINGS:   Lungs/pleura: There is a posterior left lower lobe airspace opacity in the costophrenic sulcus consistent with pneumonia or atelectasis. Lungs are otherwise clear. There may be a small left pleural effusion. There is no pneumothorax. Heart: Heart size is upper normal. The patient is status post sternotomy for CABG surgery. Mediastinum/alanna: No obvious mass or adenopathy. Skeleton: No significant bone or joint abnormality.               Impression       Posterior left lower lobe pneumonia or atelectasis and possible small left pleural effusion.           **This report has been created using voice recognition software. It may contain minor errors which are inherent in voice recognition technology. **       Final report electronically signed by Dr. Wai Feng on 3/26/2019 6:19 PM       CT Scans  3/5/19      FINDINGS:       There is a 9 mm noncalcified pulmonary nodule in the right lower lobe seen best on image 37 of the axial series. No other pulmonary nodules are identified. Lungs otherwise appear clear. No mediastinal or hilar lymphadenopathy is identified. The    unopacified heart is normal in appearance. There is prominent coronary artery calcification.       There are multiple calcifications in the bilateral kidneys. The largest of these measures 11 mm on the left. No hydronephrosis is identified. Visualized upper abdominal solid organs are otherwise grossly unremarkable. There are mild degenerative changes    of the thoracic spine.           Impression       1. 9 mm pulmonary nodule in the right lower lobe.  Consider CT follow-up in 3 months, PET/CT or biopsy per Fleischner criteria. 2. Coronary artery disease.                   **This report has been created using voice recognition software. It may contain minor errors which are inherent in voice recognition technology. **       Final report electronically signed by Dr. Caroline Varner MD on 3/5/2019 11:20 AM                 3/26/19    FINDINGS:   Ioana Roers postoperative sternotomy changes are present. The aortic arch, great vessels, and mediastinal structures remain within normal limits. There is no acute dissection. There is redemonstration of a small nodule in the right lower lobe measuring    approximately 8.9 mm. There is no additional mass visualized. There is a small left effusion and adjacent atelectatic changes which have increased since the prior study. The skeleton is negative for active pathology. There is no aneurysm.           Impression   1. Stable appearing right hilar lung mass. 2. Developing left upper lobe atelectasis and/or infiltrate. There is noted small left effusion.       **This report has been created using voice recognition software. It may contain minor errors which are inherent in voice recognition technology. **       Final report electronically signed by Dr. Gerry Truong on 3/26/2019 10:05 PM       (See actual reports for details)    Assessment   8.9 mm incidental lung nodule with low malignant potential (2.9%)  There is no right hilar mass (discussesd with radiologist)  Negative CT-A chest for PE  Expected post op changes seen in CT (small pleural effusion/atelectasis)  Acute LLL DVT  Recommendations   Follow up CT chest in 3 mos to document stability of RLL nodule  IS, ambulation  Complete at least 3 mos of [facility] for LLL DVT    Thank you for the consult and allowing us to participate in the care of your patient. Case discussed with nurse and patient/family. Questions and concerns addressed. Meds and Orders reviewed.     Electronically signed by Francisca Pak MD on 3/27/2019 at 10:43 AM

## 2019-03-28 LAB — MRSA SCREEN: NORMAL

## 2019-03-29 ENCOUNTER — TELEPHONE (OUTPATIENT)
Dept: CARDIOTHORACIC SURGERY | Age: 64
End: 2019-03-29

## 2019-04-01 LAB — BLOOD CULTURE, ROUTINE: NORMAL

## 2019-04-05 ENCOUNTER — HOSPITAL ENCOUNTER (OUTPATIENT)
Dept: GENERAL RADIOLOGY | Age: 64
Discharge: HOME | End: 2019-04-05
Payer: MEDICARE

## 2019-04-05 ENCOUNTER — HOSPITAL ENCOUNTER (OUTPATIENT)
Dept: INTERVENTIONAL RADIOLOGY/VASCULAR | Age: 64
Discharge: HOME | End: 2019-04-05
Payer: MEDICARE

## 2019-04-05 ENCOUNTER — HOSPITAL ENCOUNTER (OUTPATIENT)
Age: 64
Discharge: HOME | End: 2019-04-05
Payer: MEDICARE

## 2019-04-05 DIAGNOSIS — I25.10 CAD IN NATIVE ARTERY: ICD-10-CM

## 2019-04-05 DIAGNOSIS — I82.402 LEG DVT (DEEP VENOUS THROMBOEMBOLISM), ACUTE, LEFT (HCC): ICD-10-CM

## 2019-04-05 DIAGNOSIS — Z95.1 S/P CABG (CORONARY ARTERY BYPASS GRAFT): ICD-10-CM

## 2019-04-05 DIAGNOSIS — I21.4 NSTEMI (NON-ST ELEVATED MYOCARDIAL INFARCTION) (HCC): ICD-10-CM

## 2019-04-05 LAB
ANION GAP SERPL CALCULATED.3IONS-SCNC: 11 MEQ/L (ref 8–16)
BUN BLDV-MCNC: 18 MG/DL (ref 7–22)
CALCIUM SERPL-MCNC: 9 MG/DL (ref 8.5–10.5)
CHLORIDE BLD-SCNC: 103 MEQ/L (ref 98–111)
CO2: 26 MEQ/L (ref 23–33)
CREAT SERPL-MCNC: 1.1 MG/DL (ref 0.4–1.2)
ERYTHROCYTE [DISTWIDTH] IN BLOOD BY AUTOMATED COUNT: 14.4 % (ref 11.5–14.5)
ERYTHROCYTE [DISTWIDTH] IN BLOOD BY AUTOMATED COUNT: 48.9 FL (ref 35–45)
GFR SERPL CREATININE-BSD FRML MDRD: 67 ML/MIN/1.73M2
GLUCOSE BLD-MCNC: 108 MG/DL (ref 70–108)
HCT VFR BLD CALC: 40.8 % (ref 42–52)
HEMOGLOBIN: 12.6 GM/DL (ref 14–18)
MCH RBC QN AUTO: 29 PG (ref 26–33)
MCHC RBC AUTO-ENTMCNC: 30.9 GM/DL (ref 32.2–35.5)
MCV RBC AUTO: 93.8 FL (ref 80–94)
PLATELET # BLD: 315 THOU/MM3 (ref 130–400)
PMV BLD AUTO: 9.9 FL (ref 9.4–12.4)
POTASSIUM SERPL-SCNC: 4.6 MEQ/L (ref 3.5–5.2)
RBC # BLD: 4.35 MILL/MM3 (ref 4.7–6.1)
SODIUM BLD-SCNC: 140 MEQ/L (ref 135–145)
WBC # BLD: 9.3 THOU/MM3 (ref 4.8–10.8)

## 2019-04-05 PROCEDURE — 80048 BASIC METABOLIC PNL TOTAL CA: CPT

## 2019-04-05 PROCEDURE — 85027 COMPLETE CBC AUTOMATED: CPT

## 2019-04-05 PROCEDURE — 36415 COLL VENOUS BLD VENIPUNCTURE: CPT

## 2019-04-05 PROCEDURE — 71046 X-RAY EXAM CHEST 2 VIEWS: CPT

## 2019-04-05 PROCEDURE — 93970 EXTREMITY STUDY: CPT

## 2019-04-10 ENCOUNTER — OFFICE VISIT (OUTPATIENT)
Dept: CARDIOTHORACIC SURGERY | Age: 64
End: 2019-04-10

## 2019-04-10 VITALS
BODY MASS INDEX: 35.53 KG/M2 | WEIGHT: 253.8 LBS | SYSTOLIC BLOOD PRESSURE: 111 MMHG | HEART RATE: 63 BPM | HEIGHT: 71 IN | DIASTOLIC BLOOD PRESSURE: 75 MMHG

## 2019-04-10 DIAGNOSIS — I82.4Y2 ACUTE DEEP VEIN THROMBOSIS (DVT) OF PROXIMAL VEIN OF LEFT LOWER EXTREMITY (HCC): Primary | ICD-10-CM

## 2019-04-10 PROCEDURE — 99024 POSTOP FOLLOW-UP VISIT: CPT | Performed by: PHYSICIAN ASSISTANT

## 2019-04-10 RX ORDER — ATORVASTATIN CALCIUM 20 MG/1
40 TABLET, FILM COATED ORAL NIGHTLY
Qty: 60 TABLET | Refills: 3 | Status: SHIPPED | OUTPATIENT
Start: 2019-04-10 | End: 2019-11-20 | Stop reason: SDUPTHER

## 2019-04-10 RX ORDER — POTASSIUM CHLORIDE 20 MEQ/1
20 TABLET, EXTENDED RELEASE ORAL 2 TIMES DAILY
Qty: 60 TABLET | Refills: 0 | Status: SHIPPED | OUTPATIENT
Start: 2019-04-10 | End: 2019-05-31

## 2019-04-10 NOTE — PROGRESS NOTES
CT/CV Surgery Follow Up Office Visit      Patient's Name/Date of Birth: Nadir Angulo / 1955 (24 y.o.)      PCP: Jake Quiñones,     Date: April 10, 2019    We had the pleasure of seeing Nadir Angulo in the office today, as you know this is a very pleasant 75y.o. year old male with a history of CAD who underwent CABG x 3 on 03/08/19. Grafts included left internal mammary artery grafted to the left anterior descending artery, the free right internal mammary artery originating from the left internal mammary artery grafted to the first obtuse marginal artery, and a reversed greater saphenous aortocoronary vein graft to the posterior descending artery. Following an uncomplicated hospital stay, the pt developed a DVT and was re-admitted for evaluation. Sent home on eliquis. Pt is stable since admission. He is overall doing well. No major complaints. The pt denies chest pressure, SOB, fever, chills, N/V/D. PastMedical History:  Funmilayo Gordon  has no past medical history on file. Past Surgical History:  The patient  has a past surgical history that includes Umbilical hernia repair; lipoma resection; Coronary artery bypass graft (N/A, 3/8/2019); Cardiac surgery; vascular surgery; and hernia repair. Allergies: The patient has No Known Allergies.     Medications:    Current Outpatient Medications:     potassium chloride (KLOR-CON M) 20 MEQ extended release tablet, Take 1 tablet by mouth 2 times daily, Disp: 60 tablet, Rfl: 0    apixaban (ELIQUIS) 5 MG TABS tablet, Take 1 tablet by mouth 2 times daily 10 mg PO BID X 7 days then 5 mg PO BID, Disp: 60 tablet, Rfl: 3    metoprolol tartrate (LOPRESSOR) 25 MG tablet, Take 1 tablet by mouth 2 times daily, Disp: 60 tablet, Rfl: 3    atorvastatin (LIPITOR) 20 MG tablet, Take 2 tablets by mouth nightly, Disp: 60 tablet, Rfl: 3    acetaminophen (TYLENOL) 325 MG tablet, Take 2 tablets by mouth every 4 hours as needed for Pain, Disp: 120 tablet, Rfl: 3   Component Value Date    WBC 9.3 04/05/2019    HGB 12.6 04/05/2019    HCT 40.8 04/05/2019    MCV 93.8 04/05/2019     04/05/2019    INR 1.14 03/26/2019     BMP:   Lab Results   Component Value Date     04/05/2019    K 4.6 04/05/2019    K 4.5 03/26/2019     04/05/2019    CO2 26 04/05/2019    PHOS 2.8 03/27/2019    BUN 18 04/05/2019    CREATININE 1.1 04/05/2019    MG 2.2 03/12/2019         Assessment:  Patient Active Problem List   Diagnosis    Dyspnea on exertion    Shortness of breath    Hypertension    CAD in native artery    NSTEMI (non-ST elevated myocardial infarction) [address] [address]    Leg DVT (deep venous thromboembolism), acute, left (HCC)    Anemia    Lung infiltrate    Nodule of lower lobe of right lung       Plan 4/10/19:  1) Continue current medical therapy. 2) CXR and labs reviewed. 3)Sternal precautions are still in place for 2 full months postop with no heavy lifting, but they are cleared to start driving locally. 4) Rediscussed cardiac rehab. 5) Follow up in 6 months with venous duplex for evaluation of post DVT treatment     Thank you for allowing us to be involved in the patient's care.     Electronically by Hilary Lyon PA-C  on 4/10/2019 at 1:35 PM

## 2019-04-11 ENCOUNTER — TELEPHONE (OUTPATIENT)
Dept: CARDIOTHORACIC SURGERY | Age: 64
End: 2019-04-11

## 2019-04-11 NOTE — TELEPHONE ENCOUNTER
Pt called into office asking if he should take Plavix. Pt states yesterday at office visit Linette Cai PA-C refilled some of his medications and he picked them up today and he received Plavix and wanted to call and check if he was to start taking that. I spoke with Александр Cagle and he states that the patient is not to take Plavix that he is to continue taking Eliquis as instructed to take at discharge. Pt verbalized understanding.

## 2019-05-31 ENCOUNTER — OFFICE VISIT (OUTPATIENT)
Dept: CARDIOLOGY CLINIC | Age: 64
End: 2019-05-31
Payer: MEDICARE

## 2019-05-31 VITALS
HEIGHT: 70 IN | BODY MASS INDEX: 37.65 KG/M2 | SYSTOLIC BLOOD PRESSURE: 125 MMHG | DIASTOLIC BLOOD PRESSURE: 71 MMHG | HEART RATE: 54 BPM | WEIGHT: 263 LBS

## 2019-05-31 DIAGNOSIS — I25.83 CORONARY ARTERY DISEASE DUE TO LIPID RICH PLAQUE: Primary | ICD-10-CM

## 2019-05-31 DIAGNOSIS — I25.10 CORONARY ARTERY DISEASE DUE TO LIPID RICH PLAQUE: Primary | ICD-10-CM

## 2019-05-31 PROCEDURE — G8417 CALC BMI ABV UP PARAM F/U: HCPCS | Performed by: INTERNAL MEDICINE

## 2019-05-31 PROCEDURE — 4040F PNEUMOC VAC/ADMIN/RCVD: CPT | Performed by: INTERNAL MEDICINE

## 2019-05-31 PROCEDURE — 1123F ACP DISCUSS/DSCN MKR DOCD: CPT | Performed by: INTERNAL MEDICINE

## 2019-05-31 PROCEDURE — 1036F TOBACCO NON-USER: CPT | Performed by: INTERNAL MEDICINE

## 2019-05-31 PROCEDURE — 3017F COLORECTAL CA SCREEN DOC REV: CPT | Performed by: INTERNAL MEDICINE

## 2019-05-31 PROCEDURE — 99213 OFFICE O/P EST LOW 20 MIN: CPT | Performed by: INTERNAL MEDICINE

## 2019-05-31 PROCEDURE — G8598 ASA/ANTIPLAT THER USED: HCPCS | Performed by: INTERNAL MEDICINE

## 2019-05-31 PROCEDURE — G8427 DOCREV CUR MEDS BY ELIG CLIN: HCPCS | Performed by: INTERNAL MEDICINE

## 2019-05-31 NOTE — PROGRESS NOTES
SURGERY         Subjective:     REVIEW OF SYSTEMS  Constitutional: denies sweats, chills and fever  HENT: denies  congestion, sinus pressure, sneezing and sore throat. Eyes: denies  pain, discharge, redness and itching. Respiratory: denies apnea, cough  Gastrointestinal: denies blood in stool, constipation, diarrhea   Endocrine: denies cold intolerance, heat intolerance, polydipsia. Genitourinary: denies dysuria, enuresis, flank pain and hematuria. Musculoskeletal: denies arthralgias, joint swelling and neck pain. Neurological: denies numbness and headaches. Psychiatric/Behavioral: denies agitation, confusion, decreased concentration and dysphoric mood    All others reviewed and are negative. Objective:     /71   Pulse 54   Ht 5' 10\" (1.778 m)   Wt 263 lb (119.3 kg)   BMI 37.74 kg/m²     Wt Readings from Last 3 Encounters:   05/31/19 263 lb (119.3 kg)   04/10/19 253 lb 12.8 oz (115.1 kg)   03/27/19 255 lb 1.6 oz (115.7 kg)     BP Readings from Last 3 Encounters:   05/31/19 125/71   04/10/19 111/75   03/27/19 (!) 108/58       PHYSICAL EXAM  Constitutional: Oriented to person, place, and time. Appears well-developed and well-nourished. HENT:   Head: Normocephalic and atraumatic. Eyes: EOM are normal. Pupils are equal, round, and reactive to light. Neck: Normal range of motion. Neck supple. No JVD present. Cardiovascular: Normal rate , normal heart sounds and intact distal pulses. Pulmonary/Chest: Effort normal and breath sounds normal. No respiratory distress. No wheezes. No rales. Abdominal: Soft. Bowel sounds are normal. No distension. There is no tenderness. Musculoskeletal: Normal range of motion. No edema. Neurological: Alert and oriented to person, place, and time. No cranial nerve deficit. Coordination normal.   Skin: Skin is warm and dry. Psychiatric: Normal mood and affect.        No results found for: CKTOTAL, CKMB, CKMBINDEX    Lab Results   Component Value Date MD                                  Physician     Procedure    Type of Study      TTE procedure:ECHOCARDIOGRAM COMPLETE 2D W DOPPLER W COLOR. Procedure Date  Date: 03/05/2019 Start: 08:06 AM    Study Location: [facility]  Technical Quality: Limited visualization due to body habitus. Indications:Dyspnea / Shortness of breath. Additional Medical History:Hypertension, hyperlipidemia, sleep apnea,  shortness of breath, morbid obesity    Patient Status: Routine    Height: 70.08 inches Weight: 271.17 pounds BSA: 2.38 m^2 BMI: 38.82 kg/m^2    BP: 130/72 mmHg    Allergies    - No Known Allergies. Conclusions      Summary   Technically difficult examination. Normal left ventricle size and systolic function. Ejection fraction was   estimated at 55-60%. There were no regional left ventricular wall motion   abnormalities and wall thickness was within normal limits. The right ventricular size was normal with normal systolic function and   wall thickness. Signature      ----------------------------------------------------------------   Electronically signed by Rayo Terrell MD (Interpreting   physician) on 03/05/2019 at 08:59 AM   ----------------------------------------------------------------      Findings      Mitral Valve   The mitral valve structure was normal with normal leaflet separation. DOPPLER: The transmitral velocity was within the normal range with no   evidence for mitral stenosis. There was no evidence of mitral   regurgitation. Aortic Valve   The aortic valve leaflets were not well visualized. The aortic valve appears to be trileaflet with good leaflet separation. DOPPLER: Transaortic velocity was within the normal range with no evidence   of aortic stenosis. There was no evidence of aortic regurgitation. Tricuspid Valve   The tricuspid valve structure was normal with normal leaflet separation. DOPPLER: There was no evidence of tricuspid stenosis.  There was no   evidence of tricuspid regurgitation. Pulmonic Valve   The pulmonic valve leaflets exhibited normal thickness, no calcification,   and normal cuspal separation. DOPPLER: The transpulmonic velocity was   within the normal range with no evidence for regurgitation. Left Atrium   Left atrial size was normal.      Left Ventricle   Normal left ventricle size and systolic function. Ejection fraction was   estimated at 55-60%. There were no regional left ventricular wall motion   abnormalities and wall thickness was within normal limits. Right Atrium   Right atrial size was normal.      Right Ventricle   The right ventricular size was normal with normal systolic function and   wall thickness. Pericardial Effusion   The pericardium was normal in appearance with no evidence of a pericardial   effusion. Pleural Effusion   No evidence of pleural effusion. Aorta / Great Vessels   -Aortic root dimension within normal limits. -IVC not well visualized.      M-Mode/2D Measurements & Calculations      LV Diastolic    LV Systolic Dimension: 3.3   AV Cusp Separation: 2 cmLA   Dimension: 4.8  cm                           Dimension: 3.7 cmAO Root   cm              LV Volume Diastolic: 190 ml  Dimension: 3.1 cm   LV FS:31.3 %    LV Volume Systolic: 11.1 ml   LV PW           LV EDV/LV EDV Index: 009   Diastolic: 0.8  FA/24 M^0IU ESV/LV ESV   cm              Index: 44.1 ml/19 m^2        RV Diastolic Dimension: 2.4   Septum          EF Calculated: 59.2 %        cm   Diastolic: 0.9   cm                                           LA/Aorta: 1.19     Doppler Measurements & Calculations      MV Peak E-Wave: 69.9 cm/s   AV Peak Velocity: 152  LVOT Peak Velocity: 110   MV Peak A-Wave: 87.3 cm/s   cm/s                   cm/s   MV E/A Ratio: 0.8           AV Peak Gradient: 9.24 LVOT Peak Gradient: 5   MV Peak Gradient: 1.95 mmHg mmHg                   mmHg      MV Deceleration Time: 232                          TV Peak E-Wave: 43 cm/s   msec                                               TV Peak A-Wave: 49 cm/s                                  IVRT: 92 msec          TV Peak Gradient: 0.74   MV E' Septal Velocity: 8.8                         mmHg   cm/s   MV A' Septal Velocity: 13.6 AV DVI (Vmax):0.72     PV Peak Velocity: 73.1   cm/s                                               cm/s   MV E' Lateral Velocity: 8.1                        PV Peak Gradient: 2.14   cm/s                                               mmHg   MV A' Lateral Velocity:   13.1 cm/s   E/E' septal: 7.94   E/E' lateral: 8.63     [email or web address] [id number]       STRESS:    CATH:    Assessment/Plan       Diagnosis Orders   1. Coronary artery disease due to lipid rich plaque         CAD s/p CABG x 3 (3/8/19)  HTN  HLD  NEL on CPAP    Reports doing well since ssurgery  Had DVT of left leg   Was placed on Eliquis  Continue statin  The patient is asked to make an attempt to improve diet and exercise patterns to aid in medical management of this problem. Advised more plant based nutrition/meditarrean diet   Advised patient to call office or seek immediate medical attention if there is any new onset of  any chest pain, sob, palpitations, lightheadedness, dizziness, orthopnea, PND or pedal edema. All medication side effects were discussed in details. Thank youfor allowing me to participate in the care of this patient. Please do not hesitate to contact me for any further questions. Return in about 6 months (around 11/30/2019), or if symptoms worsen or fail to improve, for Review testing, Regular follow up.        Electronically signed by Teresita Mckeon MD [facility]  5/31/2019 at 10:26 AM

## 2019-07-02 ENCOUNTER — HOSPITAL ENCOUNTER (OUTPATIENT)
Dept: CT IMAGING | Age: 64
Discharge: HOME | End: 2019-07-02
Payer: MEDICARE

## 2019-07-02 DIAGNOSIS — R91.1 NODULE OF RIGHT LUNG: ICD-10-CM

## 2019-07-02 PROCEDURE — 71250 CT THORAX DX C-: CPT

## 2019-07-09 RX ORDER — LORATADINE 10 MG
TABLET ORAL
Qty: 30 TABLET | Refills: 3 | Status: SHIPPED | OUTPATIENT
Start: 2019-07-09 | End: 2019-10-16 | Stop reason: ALTCHOICE

## 2019-09-13 ENCOUNTER — TELEPHONE (OUTPATIENT)
Dept: CARDIOTHORACIC SURGERY | Age: 64
End: 2019-09-13

## 2019-09-13 RX ORDER — APIXABAN 5 MG/1
TABLET, FILM COATED ORAL
Qty: 60 TABLET | Refills: 3 | Status: SHIPPED | OUTPATIENT
Start: 2019-09-13 | End: 2019-10-16 | Stop reason: ALTCHOICE

## 2019-09-13 NOTE — TELEPHONE ENCOUNTER
Patient called into office asking about status of medication refill. Informed him that Elmer radha CONTE sent refill to [facility] pharmacy. I called pharmacy and verified that they have received refill and pharmacy confirms that refill of Eliquis will be ready for  at 12:00 p.m today. Patient informed. Patient also then asked if he can take antiinflammatory and muscle relaxer as well as the Eliquis. Versa  PA-C states yes he may. Pt verbalized understanding.

## 2019-10-10 ENCOUNTER — HOSPITAL ENCOUNTER (OUTPATIENT)
Dept: INTERVENTIONAL RADIOLOGY/VASCULAR | Age: 64
Discharge: HOME | End: 2019-10-10
Payer: MEDICARE

## 2019-10-10 DIAGNOSIS — I82.4Y2 ACUTE DEEP VEIN THROMBOSIS (DVT) OF PROXIMAL VEIN OF LEFT LOWER EXTREMITY (HCC): ICD-10-CM

## 2019-10-10 PROCEDURE — 93970 EXTREMITY STUDY: CPT

## 2019-10-16 ENCOUNTER — OFFICE VISIT (OUTPATIENT)
Dept: CARDIOTHORACIC SURGERY | Age: 64
End: 2019-10-16
Payer: MEDICARE

## 2019-10-16 VITALS
HEIGHT: 71 IN | DIASTOLIC BLOOD PRESSURE: 84 MMHG | SYSTOLIC BLOOD PRESSURE: 158 MMHG | HEART RATE: 52 BPM | WEIGHT: 272.4 LBS | BODY MASS INDEX: 38.14 KG/M2

## 2019-10-16 DIAGNOSIS — Z95.1 S/P CABG (CORONARY ARTERY BYPASS GRAFT): Primary | ICD-10-CM

## 2019-10-16 PROCEDURE — G8427 DOCREV CUR MEDS BY ELIG CLIN: HCPCS | Performed by: PHYSICIAN ASSISTANT

## 2019-10-16 PROCEDURE — 4040F PNEUMOC VAC/ADMIN/RCVD: CPT | Performed by: PHYSICIAN ASSISTANT

## 2019-10-16 PROCEDURE — G8484 FLU IMMUNIZE NO ADMIN: HCPCS | Performed by: PHYSICIAN ASSISTANT

## 2019-10-16 PROCEDURE — 3017F COLORECTAL CA SCREEN DOC REV: CPT | Performed by: PHYSICIAN ASSISTANT

## 2019-10-16 PROCEDURE — 99214 OFFICE O/P EST MOD 30 MIN: CPT | Performed by: PHYSICIAN ASSISTANT

## 2019-10-16 PROCEDURE — G8598 ASA/ANTIPLAT THER USED: HCPCS | Performed by: PHYSICIAN ASSISTANT

## 2019-10-16 PROCEDURE — 1036F TOBACCO NON-USER: CPT | Performed by: PHYSICIAN ASSISTANT

## 2019-10-16 PROCEDURE — G8417 CALC BMI ABV UP PARAM F/U: HCPCS | Performed by: PHYSICIAN ASSISTANT

## 2019-10-16 PROCEDURE — 1123F ACP DISCUSS/DSCN MKR DOCD: CPT | Performed by: PHYSICIAN ASSISTANT

## 2019-10-16 RX ORDER — ASPIRIN 325 MG
325 TABLET, DELAYED RELEASE (ENTERIC COATED) ORAL DAILY
Qty: 30 TABLET | Refills: 3 | Status: SHIPPED | OUTPATIENT
Start: 2019-10-16 | End: 2020-04-13

## 2019-11-20 ENCOUNTER — TELEPHONE (OUTPATIENT)
Dept: CARDIOLOGY CLINIC | Age: 64
End: 2019-11-20

## 2019-11-20 RX ORDER — ATORVASTATIN CALCIUM 20 MG/1
40 TABLET, FILM COATED ORAL NIGHTLY
Qty: 60 TABLET | Refills: 0 | Status: SHIPPED | OUTPATIENT
Start: 2019-11-20 | End: 2019-11-25 | Stop reason: SDUPTHER

## 2019-11-25 ENCOUNTER — OFFICE VISIT (OUTPATIENT)
Dept: CARDIOLOGY CLINIC | Age: 64
End: 2019-11-25
Payer: MEDICARE

## 2019-11-25 VITALS
SYSTOLIC BLOOD PRESSURE: 158 MMHG | WEIGHT: 273 LBS | HEART RATE: 60 BPM | DIASTOLIC BLOOD PRESSURE: 88 MMHG | HEIGHT: 70 IN | BODY MASS INDEX: 39.08 KG/M2

## 2019-11-25 DIAGNOSIS — I25.83 CORONARY ARTERY DISEASE DUE TO LIPID RICH PLAQUE: Primary | ICD-10-CM

## 2019-11-25 DIAGNOSIS — I25.10 CORONARY ARTERY DISEASE DUE TO LIPID RICH PLAQUE: Primary | ICD-10-CM

## 2019-11-25 PROCEDURE — G8484 FLU IMMUNIZE NO ADMIN: HCPCS | Performed by: INTERNAL MEDICINE

## 2019-11-25 PROCEDURE — 1123F ACP DISCUSS/DSCN MKR DOCD: CPT | Performed by: INTERNAL MEDICINE

## 2019-11-25 PROCEDURE — G8417 CALC BMI ABV UP PARAM F/U: HCPCS | Performed by: INTERNAL MEDICINE

## 2019-11-25 PROCEDURE — 4040F PNEUMOC VAC/ADMIN/RCVD: CPT | Performed by: INTERNAL MEDICINE

## 2019-11-25 PROCEDURE — 3017F COLORECTAL CA SCREEN DOC REV: CPT | Performed by: INTERNAL MEDICINE

## 2019-11-25 PROCEDURE — G8598 ASA/ANTIPLAT THER USED: HCPCS | Performed by: INTERNAL MEDICINE

## 2019-11-25 PROCEDURE — 1036F TOBACCO NON-USER: CPT | Performed by: INTERNAL MEDICINE

## 2019-11-25 PROCEDURE — 99213 OFFICE O/P EST LOW 20 MIN: CPT | Performed by: INTERNAL MEDICINE

## 2019-11-25 PROCEDURE — G8427 DOCREV CUR MEDS BY ELIG CLIN: HCPCS | Performed by: INTERNAL MEDICINE

## 2019-11-25 RX ORDER — LISINOPRIL 5 MG/1
5 TABLET ORAL DAILY
COMMUNITY
End: 2019-11-25 | Stop reason: SDUPTHER

## 2019-11-25 RX ORDER — FOLIC ACID/MULTIVIT,IRON,MINER .4-18-35
1 TABLET,CHEWABLE ORAL DAILY
COMMUNITY

## 2019-11-25 RX ORDER — ATORVASTATIN CALCIUM 20 MG/1
40 TABLET, FILM COATED ORAL NIGHTLY
Qty: 180 TABLET | Refills: 1 | Status: SHIPPED | OUTPATIENT
Start: 2019-11-25 | End: 2020-03-04 | Stop reason: SDUPTHER

## 2019-11-25 RX ORDER — LISINOPRIL 5 MG/1
5 TABLET ORAL DAILY
Qty: 90 TABLET | Refills: 1 | Status: SHIPPED | OUTPATIENT
Start: 2019-11-25 | End: 2020-03-04 | Stop reason: SDUPTHER

## 2019-12-03 RX ORDER — ATORVASTATIN CALCIUM 20 MG/1
TABLET, FILM COATED ORAL
Qty: 60 TABLET | Refills: 3 | OUTPATIENT
Start: 2019-12-03

## 2020-03-04 NOTE — TELEPHONE ENCOUNTER
[facility] called requesting a refill on the following medications:  Requested Prescriptions     Pending Prescriptions Disp Refills    lisinopril (PRINIVIL;ZESTRIL) 5 MG tablet 90 tablet 1     Sig: Take 1 tablet by mouth daily    acetaminophen (TYLENOL) 325 MG tablet 120 tablet 3     Sig: Take 2 tablets by mouth every 4 hours as needed for Pain    metoprolol tartrate (LOPRESSOR) 25 MG tablet 180 tablet 1     Sig: TAKE 1 TABLET BY MOUTH TWICE A DAY    atorvastatin (LIPITOR) 20 MG tablet 180 tablet 1     Sig: Take 2 tablets by mouth nightly     [facility]      Date of last visit: 11/25/19  Date of next visit (if applicable): 6/35/7780

## 2020-03-05 RX ORDER — ATORVASTATIN CALCIUM 20 MG/1
40 TABLET, FILM COATED ORAL NIGHTLY
Qty: 180 TABLET | Refills: 0 | Status: SHIPPED | OUTPATIENT
Start: 2020-03-05 | End: 2020-05-28 | Stop reason: SDUPTHER

## 2020-03-05 RX ORDER — ACETAMINOPHEN 325 MG/1
325 TABLET ORAL EVERY 4 HOURS PRN
Qty: 90 TABLET | Refills: 0 | Status: SHIPPED | OUTPATIENT
Start: 2020-03-05 | End: 2021-05-28

## 2020-03-05 RX ORDER — LISINOPRIL 5 MG/1
5 TABLET ORAL DAILY
Qty: 90 TABLET | Refills: 0 | Status: SHIPPED | OUTPATIENT
Start: 2020-03-05 | End: 2020-05-28 | Stop reason: SDUPTHER

## 2020-05-28 ENCOUNTER — OFFICE VISIT (OUTPATIENT)
Dept: CARDIOLOGY CLINIC | Age: 65
End: 2020-05-28
Payer: MEDICARE

## 2020-05-28 VITALS
HEART RATE: 51 BPM | WEIGHT: 273 LBS | HEIGHT: 70 IN | BODY MASS INDEX: 39.08 KG/M2 | DIASTOLIC BLOOD PRESSURE: 80 MMHG | SYSTOLIC BLOOD PRESSURE: 132 MMHG

## 2020-05-28 PROCEDURE — 1123F ACP DISCUSS/DSCN MKR DOCD: CPT | Performed by: INTERNAL MEDICINE

## 2020-05-28 PROCEDURE — 93000 ELECTROCARDIOGRAM COMPLETE: CPT | Performed by: INTERNAL MEDICINE

## 2020-05-28 PROCEDURE — G8427 DOCREV CUR MEDS BY ELIG CLIN: HCPCS | Performed by: INTERNAL MEDICINE

## 2020-05-28 PROCEDURE — 99213 OFFICE O/P EST LOW 20 MIN: CPT | Performed by: INTERNAL MEDICINE

## 2020-05-28 PROCEDURE — 4040F PNEUMOC VAC/ADMIN/RCVD: CPT | Performed by: INTERNAL MEDICINE

## 2020-05-28 PROCEDURE — G8417 CALC BMI ABV UP PARAM F/U: HCPCS | Performed by: INTERNAL MEDICINE

## 2020-05-28 PROCEDURE — 1036F TOBACCO NON-USER: CPT | Performed by: INTERNAL MEDICINE

## 2020-05-28 PROCEDURE — 3017F COLORECTAL CA SCREEN DOC REV: CPT | Performed by: INTERNAL MEDICINE

## 2020-05-28 RX ORDER — LISINOPRIL 5 MG/1
5 TABLET ORAL DAILY
Qty: 90 TABLET | Refills: 3 | Status: SHIPPED | OUTPATIENT
Start: 2020-05-28 | End: 2021-05-18

## 2020-05-28 RX ORDER — ATORVASTATIN CALCIUM 20 MG/1
40 TABLET, FILM COATED ORAL NIGHTLY
Qty: 180 TABLET | Refills: 3 | Status: SHIPPED | OUTPATIENT
Start: 2020-05-28 | End: 2021-03-02 | Stop reason: SDUPTHER

## 2020-05-28 NOTE — PROGRESS NOTES
0.8           AV Peak Gradient: 9.24 LVOT Peak Gradient: 5   MV Peak Gradient: 1.95 mmHg mmHg                   mmHg      MV Deceleration Time: 232                          TV Peak E-Wave: 43 cm/s   msec                                               TV Peak A-Wave: 49 cm/s                                  IVRT: 92 msec          TV Peak Gradient: 0.74   MV E' Septal Velocity: 8.8                         mmHg   cm/s   MV A' Septal Velocity: 13.6 AV DVI (Vmax):0.72     PV Peak Velocity: 73.1   cm/s                                               cm/s   MV E' Lateral Velocity: 8.1                        PV Peak Gradient: 2.14   cm/s                                               mmHg   MV A' Lateral Velocity:   13.1 cm/s   E/E' septal: 7.94   E/E' lateral: 8.63     [email or web address] [id number]       STRESS:    CATH:    Assessment/Plan       Diagnosis Orders   1. Coronary artery disease due to lipid rich plaque  EKG 12 Lead       CAD s/p CABG x 3 (3/8/19)  HTN  HLD  NEL on CPAP     Reviewed EKG  Reports doing well since ssurgery  Had DVT of left leg   Was temporarily on eliquis  Continue statin  BP slightly high today, will monitor for now  The patient is asked to make an attempt to improve diet and exercise patterns to aid in medical management of this problem. Advised more plant based nutrition/meditarrean diet   Advised patient to call office or seek immediate medical attention if there is any new onset of  any chest pain, sob, palpitations, lightheadedness, dizziness, orthopnea, PND or pedal edema. All medication side effects were discussed in details. Thank youfor allowing me to participate in the care of this patient. Please do not hesitate to contact me for any further questions. Return in about 1 year (around 5/28/2021) for Regular follow up, Review testing.        Electronically signed by

## 2021-03-02 RX ORDER — ATORVASTATIN CALCIUM 40 MG/1
40 TABLET, FILM COATED ORAL DAILY
Qty: 90 TABLET | Refills: 1 | Status: SHIPPED | OUTPATIENT
Start: 2021-03-02 | End: 2021-05-28 | Stop reason: SDUPTHER

## 2021-03-02 RX ORDER — ATORVASTATIN CALCIUM 40 MG/1
40 TABLET, FILM COATED ORAL DAILY
COMMUNITY
End: 2021-03-02 | Stop reason: SDUPTHER

## 2021-03-02 NOTE — TELEPHONE ENCOUNTER
Fax received prior auth needed for 2 tabs 20 mg nightly. Refill pended for  40 mg tab.spoke to patient voiced understanding.

## 2021-05-19 RX ORDER — LISINOPRIL 5 MG/1
TABLET ORAL
Qty: 90 TABLET | Refills: 0 | Status: SHIPPED | OUTPATIENT
Start: 2021-05-19 | End: 2021-05-28 | Stop reason: SDUPTHER

## 2021-05-28 ENCOUNTER — OFFICE VISIT (OUTPATIENT)
Dept: CARDIOLOGY CLINIC | Age: 66
End: 2021-05-28
Payer: MEDICARE

## 2021-05-28 VITALS
SYSTOLIC BLOOD PRESSURE: 116 MMHG | HEIGHT: 70 IN | BODY MASS INDEX: 39.37 KG/M2 | WEIGHT: 275 LBS | DIASTOLIC BLOOD PRESSURE: 62 MMHG | HEART RATE: 50 BPM

## 2021-05-28 DIAGNOSIS — I25.83 CORONARY ARTERY DISEASE DUE TO LIPID RICH PLAQUE: Primary | ICD-10-CM

## 2021-05-28 DIAGNOSIS — I25.10 CORONARY ARTERY DISEASE DUE TO LIPID RICH PLAQUE: Primary | ICD-10-CM

## 2021-05-28 PROCEDURE — 4040F PNEUMOC VAC/ADMIN/RCVD: CPT | Performed by: INTERNAL MEDICINE

## 2021-05-28 PROCEDURE — 1036F TOBACCO NON-USER: CPT | Performed by: INTERNAL MEDICINE

## 2021-05-28 PROCEDURE — 3017F COLORECTAL CA SCREEN DOC REV: CPT | Performed by: INTERNAL MEDICINE

## 2021-05-28 PROCEDURE — 93000 ELECTROCARDIOGRAM COMPLETE: CPT | Performed by: INTERNAL MEDICINE

## 2021-05-28 PROCEDURE — G8427 DOCREV CUR MEDS BY ELIG CLIN: HCPCS | Performed by: INTERNAL MEDICINE

## 2021-05-28 PROCEDURE — 99214 OFFICE O/P EST MOD 30 MIN: CPT | Performed by: INTERNAL MEDICINE

## 2021-05-28 PROCEDURE — 1123F ACP DISCUSS/DSCN MKR DOCD: CPT | Performed by: INTERNAL MEDICINE

## 2021-05-28 PROCEDURE — G8417 CALC BMI ABV UP PARAM F/U: HCPCS | Performed by: INTERNAL MEDICINE

## 2021-05-28 RX ORDER — ATORVASTATIN CALCIUM 40 MG/1
40 TABLET, FILM COATED ORAL DAILY
Qty: 90 TABLET | Refills: 3 | Status: SHIPPED | OUTPATIENT
Start: 2021-05-28 | End: 2022-05-27 | Stop reason: SDUPTHER

## 2021-05-28 RX ORDER — CELECOXIB 200 MG/1
200 CAPSULE ORAL DAILY
COMMUNITY

## 2021-05-28 RX ORDER — LISINOPRIL 5 MG/1
TABLET ORAL
Qty: 90 TABLET | Refills: 3 | Status: SHIPPED | OUTPATIENT
Start: 2021-05-28 | End: 2022-05-27 | Stop reason: SDUPTHER

## 2021-05-28 NOTE — PROGRESS NOTES
[address] [place] [address],[place] [address]  [facility] [address]  Dept: [phone]  Dept Fax: [phone]  Loc: [phone]    Visit Date: 5/28/2021    Mr. Oralia Seo is a 74 y.o. male  who presented for:  Chief Complaint   Patient presents with    Check-Up    Coronary Artery Disease       HPI:   75 yo M c hx of CAD s/p CABG on 3/8/19 (LIMA to LAD, HOOD to OM, SVG on RCA),HTN, HLD, NEL on CPAP and Left DVT. Denies any chest pain, sob, palpitations, lightheadedness, dizziness, orthopnea, PND or pedal edema. Plays golf daily. Current Outpatient Medications:     celecoxib (CELEBREX) 200 MG capsule, Take 200 mg by mouth daily, Disp: , Rfl:     lisinopril (PRINIVIL;ZESTRIL) 5 MG tablet, TAKE 1 TABLET BY MOUTH EVERY DAY, Disp: 90 tablet, Rfl: 3    aspirin 325 MG EC tablet, TAKE 1 TABLET BY MOUTH EVERY DAY, Disp: 90 tablet, Rfl: 3    atorvastatin (LIPITOR) 40 MG tablet, Take 1 tablet by mouth daily, Disp: 90 tablet, Rfl: 3    multivitamin-iron-minerals-folic acid (CENTRUM) chewable tablet, Take 1 tablet by mouth daily, Disp: , Rfl:     nitroGLYCERIN (NITROSTAT) 0.4 MG SL tablet, up to max of 3 total doses. If no relief after 1 dose, call 911., Disp: 25 tablet, Rfl: 3    Past Medical History  Mae Arnold  has a past medical history of Blood clot in vein. Social History  Mae Arnold  reports that he has never smoked. He has never used smokeless tobacco. He reports current alcohol use. He reports that he does not use drugs. Family History  Mae Arnold family history includes Heart Attack in his father; Heart Failure in his brother; Heart Surgery in his father; Hypertension in his father and mother.     Past Surgical History   Past Surgical History:   Procedure Laterality Date    CARDIAC SURGERY      CORONARY ARTERY BYPASS GRAFT N/A 3/8/2019    CABG CORONARY ARTERY BYPASS X 4 WITH PAULA performed by Cathy Mcgrath MD at [address] [place] [address] Lab Results   Component Value Date    WBC 9.3 04/05/2019    RBC 4.35 04/05/2019    HGB 12.6 04/05/2019    HCT 40.8 04/05/2019    MCV 93.8 04/05/2019    MCH 29.0 04/05/2019    MCHC 30.9 04/05/2019     04/05/2019    MPV 9.9 04/05/2019       Lab Results   Component Value Date     04/05/2019    K 4.6 04/05/2019    K 4.5 03/26/2019     04/05/2019    CO2 26 04/05/2019    BUN 18 04/05/2019    LABALBU 3.2 03/27/2019    CREATININE 1.1 04/05/2019    CALCIUM 9.0 04/05/2019    LABGLOM 67 04/05/2019    GLUCOSE 108 04/05/2019       Lab Results   Component Value Date    ALKPHOS 201 03/26/2019    ALT 25 03/26/2019    AST 22 03/26/2019    PROT 6.9 03/26/2019    BILITOT 0.6 03/26/2019    BILIDIR <0.2 03/05/2019    LABALBU 3.2 03/27/2019       Lab Results   Component Value Date    MG 2.2 03/12/2019       Lab Results   Component Value Date    INR 1.14 (H) 03/26/2019    INR 1.04 03/05/2019    INR 0.98 03/05/2019         Lab Results   Component Value Date    LABA1C 5.4 03/05/2019       No results found for: TRIG, HDL, LDLCALC, LDLDIRECT, LABVLDL    No results found for: TSH      Testing Reviewed:      I haveindividually reviewed the below cardiac tests    EKG:    ECHO:   Results for orders placed during the hospital encounter of 03/05/19   Echocardiogram 2D/ M-Mode/ Colorflow/ Do    Narrative Transthoracic Echocardiography Report (TTE)     Demographics      Patient Name    Zaida Chacon Gender                Male      MR #            [id number]      Race                                                       Ethnicity      Account #       [de-identified]      Room Number           0012      Accession       [id number]      Date of Study         03/05/2019   Number      Date of Birth   1955     Referring Physician   Chantelle Jacobo MD      Age             70 year(s)     [address] Interpreting          Quintin Boland MD                                  Physician     Procedure    Type of Study      TTE procedure:ECHOCARDIOGRAM COMPLETE 2D W DOPPLER W COLOR. Procedure Date  Date: 03/05/2019 Start: 08:06 AM    Study Location: [facility]  Technical Quality: Limited visualization due to body habitus. Indications:Dyspnea / Shortness of breath. Additional Medical History:Hypertension, hyperlipidemia, sleep apnea,  shortness of breath, morbid obesity    Patient Status: Routine    Height: 70.08 inches Weight: 271.17 pounds BSA: 2.38 m^2 BMI: 38.82 kg/m^2    BP: 130/72 mmHg    Allergies    - No Known Allergies. Conclusions      Summary   Technically difficult examination. Normal left ventricle size and systolic function. Ejection fraction was   estimated at 55-60%. There were no regional left ventricular wall motion   abnormalities and wall thickness was within normal limits. The right ventricular size was normal with normal systolic function and   wall thickness. Signature      ----------------------------------------------------------------   Electronically signed by Quintin Boland MD (Interpreting   physician) on 03/05/2019 at 08:59 AM   ----------------------------------------------------------------      Findings      Mitral Valve   The mitral valve structure was normal with normal leaflet separation. DOPPLER: The transmitral velocity was within the normal range with no   evidence for mitral stenosis. There was no evidence of mitral   regurgitation. Aortic Valve   The aortic valve leaflets were not well visualized. The aortic valve appears to be trileaflet with good leaflet separation. DOPPLER: Transaortic velocity was within the normal range with no evidence   of aortic stenosis. There was no evidence of aortic regurgitation. Tricuspid Valve   The tricuspid valve structure was normal with normal leaflet separation.    DOPPLER: There was no evidence of tricuspid TV Peak E-Wave: 43 cm/s   msec                                               TV Peak A-Wave: 49 cm/s                                  IVRT: 92 msec          TV Peak Gradient: 0.74   MV E' Septal Velocity: 8.8                         mmHg   cm/s   MV A' Septal Velocity: 13.6 AV DVI (Vmax):0.72     PV Peak Velocity: 73.1   cm/s                                               cm/s   MV E' Lateral Velocity: 8.1                        PV Peak Gradient: 2.14   cm/s                                               mmHg   MV A' Lateral Velocity:   13.1 cm/s   E/E' septal: 7.94   E/E' lateral: 8.63     [email or web address] [id number]       STRESS:    CATH:    Assessment/Plan       Diagnosis Orders   1. Coronary artery disease due to lipid rich plaque  EKG 12 Lead       CAD s/p CABG x 3 (3/8/19)  HTN  HLD  NEL on CPAP     Reviewed EKG  Had DVT of left leg   Was temporarily on eliquis  Continue statin  HR slower today, Will d/c metoprolol  Continue without BB. The patient is asked to make an attempt to improve diet and exercise patterns to aid in medical management of this problem. Advised more plant based nutrition/meditarrean diet   Advised patient to call office or seek immediate medical attention if there is any new onset of  any chest pain, sob, palpitations, lightheadedness, dizziness, orthopnea, PND or pedal edema. All medication side effects were discussed in details. Thank youfor allowing me to participate in the care of this patient. Please do not hesitate to contact me for any further questions. Return in about 1 year (around 5/28/2022), or if symptoms worsen or fail to improve, for Regular follow up, Review testing.        Electronically signed by Razia Kelly MD [facility]  5/28/2021 at 10:02 AM

## 2022-02-26 NOTE — PROGRESS NOTES
[facility] initial consultation note    Shasta                                                Chief complaint: Shasta is a 69 y.o.oldmale came for further evaluation regarding his sleep apnea  with referral from Self    [place]:    Sleep/Wake schedule:  Usual time to go to bed during the work/regular day of week: 11:30 PM to 12 midnight  Usual time to wake up during the work//regular day of week : 6:45 AM  Over the weekends his sleep schedule:[x]phase delayed. He feels the same on the weekends despite sleeping long time. He usually falls a sleep in less than: Less than 5 minutes  He takes naps: Yes. Number of naps per month: He takes naps once twice a month  During each nap he spends a total of: 30 to 40 minutes. He is not using his CPAP device during napping. The naps were reported as refreshing: Yes    Sleep Hygiene:    Is the temperature and evironment in his bed room is acceptable to him: Yes. He watches Television in his bed room: No.  He read books, study, pay bills etc in the bed: No.  Frequency He wake up during night/sleep: 2  Majority of nocturnal awakenings are for urination: Yes. Difficulty in falling back to sleep after nocturnal awakenings: No    Do you drink coffee: No.   Do you drink caffeinated beverages i.e sodas: No.    Do you drink tea: Yes. He drinks tea 2-3 times a week with 1 glass after sweet tea      Do you drink alcoholic beverages: Yes. He drinks 3-4 alcoholic drinks per week  History of recreational drug use: No.     Social History     Tobacco Use    Smoking status: Never Smoker    Smokeless tobacco: Never Used   Vaping Use    Vaping Use: Never used   Substance Use Topics    Alcohol use: Yes     Comment: Occasional    Drug use: Never       Sleep apnea symptoms:    Hewas diagnosed with sleep apnea in the past.  Please see diagnostic data section for details.      History of headaches in the morning:No.  He develops headaches if he do not use CPAP device  History of dry mouth in the morning: Yes. Occasionally  History of palpitations during night time/nocturnal awakenings: No.  History of sweating during night time/nocturnal awakenings: No    General:  History of head injury in the past: No.    History of seizures: NO.  Rest less legs syndrome symptoms:NO  History suggestive of periodic limb movements during sleep: NO  History suggestive of hypnagogic hallucinations: NO  History suggestive of hypnopompic hallucinations: NO  History suggestive of sleep talking: NO  History suggestive of sleep walking:NO  History suggestive of bruxism: NO      History suggestive of cataplexy: NO  History suggestive of sleep paralysis:NO    Family history of sleep disorders:  Family history of obstructive sleep apnea: Yes. His brother and sister were diagnosed with obstructive sleep apnea. His brother is currently not using CPAP device. His sister is currently on CPAP therapy. Family history of Narcolepsy: His brother was diagnosed with narcolepsy. Family history of Rest less legs syndrome : No.        History regarding old sleep studies:  Prior history of sleep study: Yes. Please see the diagnostic data section for details. Using CPAP device: Yes. Currently using home Oxygen: NO.      Patient considerations:  Is the patient is ambulatory: Yes  Patient is currently using: None of these Wheelchair, Aupix or [employer]. Para/Quadriplegic: NO  Hearing deficit : NO  Claustrophobic: NO  MDD : NO  Blind: NO  Incontinent: NO  Para/Quadraplegi: NO.   Need transportation to and from Sleep Center:NO    Social History:  Social History     Tobacco Use    Smoking status: Never Smoker    Smokeless tobacco: Never Used   Vaping Use    Vaping Use: Never used   Substance Use Topics    Alcohol use: Yes     Comment: Occasional    Drug use: Never   . He is currently working: Yes. [x] Retired.   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 Past Medical History:   Diagnosis Date    Blood clot in vein     right leg       Past Surgical History:   Procedure Laterality Date    CARDIAC SURGERY      CORONARY ARTERY BYPASS GRAFT N/A 3/8/2019    CABG CORONARY ARTERY BYPASS X 4 WITH PAULA performed by Emanuel Peterson MD at [address]      rt leg    UMBILICAL HERNIA REPAIR      VASCULAR SURGERY         No Known Allergies    Current Outpatient Medications   Medication Sig Dispense Refill    celecoxib (CELEBREX) 200 MG capsule Take 200 mg by mouth daily      lisinopril (PRINIVIL;ZESTRIL) 5 MG tablet TAKE 1 TABLET BY MOUTH EVERY DAY 90 tablet 3    aspirin 325 MG EC tablet TAKE 1 TABLET BY MOUTH EVERY DAY 90 tablet 3    atorvastatin (LIPITOR) 40 MG tablet Take 1 tablet by mouth daily 90 tablet 3    multivitamin-iron-minerals-folic acid (CENTRUM) chewable tablet Take 1 tablet by mouth daily      nitroGLYCERIN (NITROSTAT) 0.4 MG SL tablet up to max of 3 total doses. If no relief after 1 dose, call 911. 25 tablet 3     No current facility-administered medications for this visit. Family History   Problem Relation Age of Onset    Hypertension Mother     Heart Surgery Father     Heart Attack Father     Hypertension Father     Heart Failure Brother         Review of Systems:    General/Constitutional: He gained 56 pounds of weight from his old sleep study performed in 2001 with normal appetite. No fever or chills. HENT: Negative. Eyes: Negative. Upper respiratory tract: Occasional nasal stuffiness with no post nasal drip. He uses over-the-counter Afrin nasal spray  Lower respiratory tract/ lungs: No cough or sputum production. No hemoptysis. Cardiovascular: No palpitations or chest pain. Gastrointestinal: No nausea or vomiting.   Neurological: No focal neurologiacal weakness. Extremities: No edema. Musculoskeletal: No complaints. Genitourinary: No complaints. Hematological: Negative. Psychiatric/Behavioral: Negative. Skin: No itching. /76 (Site: Left Upper Arm, Position: Sitting, Cuff Size: Medium Adult)   Pulse 75   Temp 98.2 °F (36.8 °C)   Ht 5' 11\" (1.803 m)   Wt 286 lb 3.2 oz (129.8 kg)   SpO2 97% Comment: room air at rest  BMI 39.92 kg/m²   BMI:  Body mass index is 39.92 kg/m². Mallampati airway Class:4  Neck Circumference:18. Inches  [place] sleepiness score 3/23/22: 4  Sleep apnea quality of life questionnaire:88    Physical Exam:  Nursing note and vitals reviewed. Constitutional: Patient appears well built and well nourished. No distress. Patient is oriented to person, place, and time. HENT:   Head: Normocephalic and atraumatic. Right Ear: External ear normal.   Left Ear: External ear normal.   Mouth/Throat: Oropharynx is clear and moist.  No oral thrush. Eyes: Conjunctivae are normal. Pupils are equal, round, and reactive to light. No scleral icterus. Neck: Neck supple. No JVD present. No tracheal deviation present. Cardiovascular: Normal rate, regular rhythm, normal heart sounds. No murmur heard. Pulmonary/Chest: Effort normal and breath sounds normal. No stridor. No respiratory distress. No wheezes. No rales. Patient exhibits no tenderness. Abdominal: Soft. Patient exhibits no distension. No tenderness. Musculoskeletal: Normal range of motion. Extremities: Patient exhibits bilateral 1+ leg edema and no tenderness. Lymphadenopathy:  No cervical adenopathy. Neurological: Patient is alert and oriented to person, place, and time. Skin: Skin is warm and dry. Patient is not diaphoretic. Old CABG scar present anteriorly in the middle of the chest  Psychiatric: Patient  has a normal mood and affect.  Patient behavior is normal.     Diagnostic Data:      Sleep test:  He underwent baseline sleep study performed on 29 July 2001 at [facility], [address] [place] [address]. His weight on the day of test was 230 pounds. No dictated report of Dr. Ramo Larios MD is available for me to review regarding the 4% oxygen desaturation criteria to score hypopneas. CPAP test:  He underwent CPAP titration study performed at [facility] sleep lab on 29 July 2001:      No dictated report of Dr. Ramo Larios MD is available for me to review regarding the 4% oxygen desaturation criteria to score hypopneas. Assessment:  -He was diagnosed with severe obstructive sleep apnea by sleep study performed on 29 July 2001 at [facility] sleep lab. He was subsequently titrated to a CPAP pressure of 8 cm of water. However, he is currently a CPAP device is an old and obsolete CPAP machine. It needs a replacement with a new CPAP device. Patient told me that he is using his CPAP device with excellent compliance for >4hours and benefiting from it. He denies any clinical symptoms.  -Inadequate sleep hygiene.  -Coronary disease status post bypass surgery in 2018 at [address] [place] [address]. He follows with Dr. Alena Yanez MD  -Essential hypertension on treatment with medications under control.  -History of left lower extremity DVT in 2018. He was treated with Eliquis for 3 months. It was subsequently discontinued. Recommendations/Plan:  -He was advised to stop taking Afrin nasal spray. -He was advised to use over the counter nasal saline spray 2 sprays each nostril tid PRN. -He was offered to go for in lab retitration study due to significant weight gain of 56 pounds from his old CPAP titration study performed in 2001. However, patient want to go for a new CPAP device. He did not want to go for in lab CPAP retitration at this time.  -Will given a prescription for a new CPAP machine with current pressure of 8cm H20.  The new CPAP machine must have capabilities to give downloads regarding his residual AHI and >4hour compliance. -He was advised to continue current positive airway pressure therapy with above described pressure.  -He advised to keep good compliance with current recommended pressure to get optimal results and clinical improvement.  -Follow with my clinic in 6 to 8 weeks for clinical reevaluation with review of download. -He was advised to call [employer] regarding supplies if needed. -He was advised call my office for earlier appointment if needed for worsening of sleep symptoms.  -He was advised to continue to practice good sleep hygiene practices.  -He was advised to loose weight by controlling diet and doing exercise once cleared by his cardiologist.  -Kimmy Varghese educated about my impression and plan. Patient verbalizes understanding.        -I personally reviewed updated the Past medical hx, Past surgical hx,Social hx, Family hx, Medications, Allergies in the discrete data section of the patient chart along with labs, Pulmonary medicine,Sleep medicine related, Pathological, Microbiological and Radiological investigations. Addendum done on 4/6/22 at 6:24 PM EDT:  I was informed by Ms. Fariha Bah, [facility] [employer] that the patient's old sleep study performed in 2001 was not scored by using a 4% desaturation criteria for [facility] insurance requirement. Patient needs a new PSG/split-night sleep study to check the current status of sleep apnea and to get optimal CPAP pressure settings to start on CPAP device. Patient also need a new CPAP order after having above testing done. According to Demarcus Arrieta, patient is aware of the reason behind repeating sleep testing. Plan:  - Schedule patient for nocturnal polysomnogram (Sleep study) with split night protocol at [facility] sleep lab to diagnose sleep apnea and to get optimal pressure I.e CPAP or BiPAP to start/continue PAP therapy.  Patient to follow with my clinic at [facility] sleep clinic in 6 to 8 weeks with CPAP download for further evaluation.  -I had a discussion with patient regarding avialable treatment options for his sleep disorder breathing including but not limited to CPAP titration in the sleep lab Vs.Dental appliance placement with referral to a local dentist Vs other available surgical options including Uvulopalatopharyngoplasty, maxillomandibular ostomy and tracheostomy as last option. At the end of discussion, he decided on CPAP/BiPAP/AutoSV as a treatment if he found to have obstructive sleep apnea during above test/study.  -Patient needs a new CPAP order after above testing.

## 2022-03-23 ENCOUNTER — INITIAL CONSULT (OUTPATIENT)
Dept: PULMONOLOGY | Age: 67
End: 2022-03-23
Payer: MEDICARE

## 2022-03-23 VITALS
WEIGHT: 286.2 LBS | BODY MASS INDEX: 40.07 KG/M2 | HEART RATE: 75 BPM | OXYGEN SATURATION: 97 % | HEIGHT: 71 IN | TEMPERATURE: 98.2 F | DIASTOLIC BLOOD PRESSURE: 76 MMHG | SYSTOLIC BLOOD PRESSURE: 118 MMHG

## 2022-03-23 DIAGNOSIS — Z99.89 OSA ON CPAP: ICD-10-CM

## 2022-03-23 DIAGNOSIS — I10 PRIMARY HYPERTENSION: Primary | ICD-10-CM

## 2022-03-23 DIAGNOSIS — G47.33 OSA ON CPAP: ICD-10-CM

## 2022-03-23 DIAGNOSIS — G47.30 SLEEP APNEA, UNSPECIFIED TYPE: ICD-10-CM

## 2022-03-23 DIAGNOSIS — I25.10 CAD IN NATIVE ARTERY: ICD-10-CM

## 2022-03-23 PROCEDURE — G8484 FLU IMMUNIZE NO ADMIN: HCPCS | Performed by: INTERNAL MEDICINE

## 2022-03-23 PROCEDURE — 99204 OFFICE O/P NEW MOD 45 MIN: CPT | Performed by: INTERNAL MEDICINE

## 2022-03-23 PROCEDURE — 1123F ACP DISCUSS/DSCN MKR DOCD: CPT | Performed by: INTERNAL MEDICINE

## 2022-03-23 PROCEDURE — 3017F COLORECTAL CA SCREEN DOC REV: CPT | Performed by: INTERNAL MEDICINE

## 2022-03-23 PROCEDURE — 4040F PNEUMOC VAC/ADMIN/RCVD: CPT | Performed by: INTERNAL MEDICINE

## 2022-03-23 PROCEDURE — G8427 DOCREV CUR MEDS BY ELIG CLIN: HCPCS | Performed by: INTERNAL MEDICINE

## 2022-03-23 PROCEDURE — 1036F TOBACCO NON-USER: CPT | Performed by: INTERNAL MEDICINE

## 2022-03-23 PROCEDURE — G8417 CALC BMI ABV UP PARAM F/U: HCPCS | Performed by: INTERNAL MEDICINE

## 2022-03-23 NOTE — PATIENT INSTRUCTIONS
Recommendations/Plan:  -He was advised to stop taking Afrin nasal spray. -He was advised to use over the counter nasal saline spray 2 sprays each nostril tid PRN. -He was offered to go for in lab retitration study due to significant weight gain of 56 pounds from his old CPAP titration study performed in 2001. However, patient want to go for a new CPAP device. He did not want to go for in lab CPAP retitration at this time.  -Will given a prescription for a new CPAP machine with current pressure of 8cm H20. The new CPAP machine must have capabilities to give downloads regarding his residual AHI and >4hour compliance. -He was advised to continue current positive airway pressure therapy with above described pressure.  -He advised to keep good compliance with current recommended pressure to get optimal results and clinical improvement.  -Follow with my clinic in 6 to 8 weeks for clinical reevaluation with review of download. -He was advised to call [employer] regarding supplies if needed. -He was advised call my office for earlier appointment if needed for worsening of sleep symptoms.  -He was advised to continue to practice good sleep hygiene practices.  -He was advised to loose weight by controlling diet and doing exercise once cleared by his cardiologist.  -Abdoulaye Oglesby educated about my impression and plan. Patient verbalizes understanding.

## 2022-03-23 NOTE — PROGRESS NOTES
Chief Complaint: New sleep consult prior studies split-night 7/31/01    Mallampati airway Class:4  Neck Circumference:18. Inches    [place] sleepiness score 3/23/22:   Sleep apnea quality of life questionnaire:.

## 2022-05-16 ENCOUNTER — HOSPITAL ENCOUNTER (OUTPATIENT)
Dept: SLEEP CENTER | Age: 67
Discharge: HOME | End: 2022-05-18
Payer: MEDICARE

## 2022-05-16 DIAGNOSIS — I25.10 CAD IN NATIVE ARTERY: ICD-10-CM

## 2022-05-16 DIAGNOSIS — I10 PRIMARY HYPERTENSION: ICD-10-CM

## 2022-05-16 DIAGNOSIS — G47.30 SLEEP APNEA, UNSPECIFIED TYPE: ICD-10-CM

## 2022-05-17 DIAGNOSIS — G47.33 OSA ON CPAP: Primary | ICD-10-CM

## 2022-05-17 DIAGNOSIS — I25.10 CAD IN NATIVE ARTERY: ICD-10-CM

## 2022-05-17 DIAGNOSIS — Z99.89 OSA ON CPAP: Primary | ICD-10-CM

## 2022-05-17 DIAGNOSIS — I10 PRIMARY HYPERTENSION: ICD-10-CM

## 2022-05-17 LAB — STATUS: NORMAL

## 2022-05-17 PROCEDURE — 95811 POLYSOM 6/>YRS CPAP 4/> PARM: CPT

## 2022-05-18 NOTE — PROGRESS NOTES
[address] [place] [address]                               SLEEP STUDY REPORT    PATIENT NAME: Chrystine Schlatter                     :        1955  MED REC NO:   [id number]                           ROOM:  ACCOUNT NO:   [de-identified]                           ADMIT DATE: 2022  PROVIDER:     Maribeth Eisenmenger. MD Ignacio    DATE OF STUDY:  2022    SPLIT-NIGHT SLEEP STUDY REPORT    REFERRING PROVIDER:  Jane Ross MD    The patient's height is 71 inches, weight is 256 pounds with a BMI of  35.7. HISTORY:  The patient is a 78-year-old gentleman who was initially  evaluated by me on 2022. The patient was diagnosed with severe  obstructive sleep apnea by a sleep study performed in  at [facility].  The patient's CPAP machine became obsolete and needs  replacement. The patient was scheduled for split-night sleep study at  the request of the patient's DME company. The old sleep study was not  scored by using 4% desaturation criteria per Medicare's requirement. The patient had associated comorbidities including coronary artery  disease with history of bypass surgery and essential hypertension. METHODS:  The patient underwent digital polysomnography in compliance  with the standards and specifications from the AASM Manual including the  simultaneous recording of 3 EEG channels (F4-M1, C4-M1, and O2-M1 with  back up electrodes F3-M2, C3-M2, and O1-M2), 2 EOG channels (E1-M2, and  E2-M1,), EMG (chin, left & right leg), EKG, Nonin pulse oximetry with   less than 2 second averaging time, body position, airflow recorded by  oral-nasal thermal sensor and nasal air pressure transducer, plus  respiratory effort recorded by calibrated respiratory inductance  plethysmography (RIP), flow volume loop, sound and video.   Sleep staging  and scoring followed the standard put forth by the American Academy of  Sleep Medicine and utilized the 1B obstructive hypopnea event  desaturation of 4 percent or greater. INTERPRETATION:  This is a split-night sleep study, which is going to be  dictated as part A and part B. PART A:  The diagnostic portion of split-night sleep study was performed  on 05/16/2022. The study was started at 10:13 p.m. and was terminated  at 12:03 a.m. with a total recording time of 110.2 minutes, sleep period  time was 93 minutes, and total sleep time was 73.5 minutes. Overall  sleep efficiency was 66.7% of total sleep time. The sleep onset latency  was 17.2 minutes, and wake after sleep onset was 19.5 minutes. REM  sleep latency was not available due to absent REM sleep. SLEEP STAGING AND DISTRIBUTION SUMMARY:  Revealed the patient spent 10  minutes in stage I consisting of 13.6% of total sleep time, 63.5 minutes  in stage II consisting of 86.4%. The patient had absent slow wave sleep  and also REM sleep. RESPIRATORY EVENT ANALYSIS:  Revealed the patient had a total of 0  apneas. The patient had a total of 63 hypopneas, all of them were  obstructive in nature. The total number of apneas and hypopneas  recorded during the study was 63 with an apnea-hypopnea index of 51.4. The patient's REM sleep apnea-hypopnea index was not available due to  absent REM sleep. POSITION ANALYSIS:  Revealed the patient spent 73.5 minutes in supine  position with supine apnea-hypopnea index of 51.4. PERIODIC LIMB MOVEMENT ANALYSIS:  Revealed the patient did not have any  periodic limb movements. The patient had a total of 18 spontaneous  arousals with a spontaneous arousal index of 14.7. OXYGEN SATURATION MONITORING:  Revealed the patient had a maximum oxygen  desaturation to 79% with a mean oxygen saturation of 92.7%. The patient  spent a total of 5.2 minutes below oxygen saturation less than 88%. EKG MONITORING:  Revealed normal sinus rhythm.     PART B:  The treatment part of split-night sleep study was performed on  05/16/2022. The study was started at 12:09 a.m. and was terminated at  05:03 a.m. with a total recording time of 294 minutes, and sleep period  time was 266.1 minutes. Total sleep time was 229.5 minutes and overall  sleep efficiency was 78.1%. The sleep onset latency was 27.8 minutes,  and wake after sleep-onset was 36.6 minutes. REM sleep latency was 24.5  minutes. SLEEP STAGING AND DISTRIBUTION SUMMARY:  Revealed the patient spent 27.5  minutes in stage I consisting of 12% of total sleep time, 162.5 minutes  in stage II consisting of 70.8%, 39.5 minutes in REM sleep consisting of  17.2% of total sleep time. The patient had absent slow wave sleep  during treatment part. CPAP TITRATION STUDY:  The CPAP titration study was started with a CPAP  pressure of 6 cm of water, and the CPAP pressure was gradually increased  to a CPAP pressure of 12 cm of water by titrating to apneas and  hypopneas. At a CPAP pressure of 12 cm of water, the patient spent a  total of 29.39 minutes in bed. Out of 29.39 minutes, the patient slept  for a period of 3 minutes 2 seconds in REM sleep and 19.3 minutes in  non-REM sleep. At a CPAP pressure of 21/17 cm of water, the patient had  a total of 1 obstructive hypopnea event with an apnea-hypopnea index of  2.7. The maximum oxygen desaturation recorded at this pressure was 89%  with a mean oxygen saturation of 93.8%. Towards the end of the study,  at a CPAP pressure of 12 cm of water, the patient's oxygen saturation  remained majority of the time between 91% to 95%. This titration was  performed on room air. PERIODIC LIMB MOVEMENT ANALYSIS:  Revealed the patient did not have any  periodic limb movements during treatment part. The patient had a total  of 37 spontaneous arousals with a spontaneous arousal index of 9.7. IMPRESSION:  1. Severe obstructive sleep apnea.   The patient had optimal  re-titration to a CPAP pressure of 12 cm of water with room air. 2.  Absent slow wave sleep and REM sleep during diagnostic portion. The  patient had absent slow wave sleep during treatment part. The patient  achieved REM sleep during treatment part. 3.  Nocturnal hypoxia noted during baseline sleep study, resolved with  CPAP therapy. 4.  Coronary artery disease, status post bypass surgery. 5.  Essential hypertension. 6.  History of left lower extremity DVT in 2018. RECOMMENDATIONS:  1. For the patient's sleep-disordered breathing, we recommend starting  back on treatment with a CPAP pressure of 12 cm of water with room air. 2.  The patient is currently in need of new CPAP device, hence the  patient will be provided with the prescription for replacement with a  new CPAP device. 3.  The patient should be scheduled for followup with my clinic in six  to eight weeks. I recommended CPAP therapy for a clinical reevaluation  with review of download.         Carmela Adhikari MD    D: 05/17/2022 15:45:06       T: 05/17/2022 18:25:03     [email or web address]  Job#: [id number]     Doc#: [id number]    CC:

## 2022-05-19 ENCOUNTER — TELEPHONE (OUTPATIENT)
Dept: SLEEP CENTER | Age: 67
End: 2022-05-19

## 2022-05-27 ENCOUNTER — OFFICE VISIT (OUTPATIENT)
Dept: CARDIOLOGY CLINIC | Age: 67
End: 2022-05-27
Payer: MEDICARE

## 2022-05-27 VITALS
SYSTOLIC BLOOD PRESSURE: 159 MMHG | DIASTOLIC BLOOD PRESSURE: 81 MMHG | HEIGHT: 71 IN | WEIGHT: 279 LBS | HEART RATE: 61 BPM | BODY MASS INDEX: 39.06 KG/M2

## 2022-05-27 DIAGNOSIS — I25.10 CORONARY ARTERY DISEASE DUE TO LIPID RICH PLAQUE: Primary | ICD-10-CM

## 2022-05-27 DIAGNOSIS — I25.83 CORONARY ARTERY DISEASE DUE TO LIPID RICH PLAQUE: Primary | ICD-10-CM

## 2022-05-27 PROCEDURE — 3017F COLORECTAL CA SCREEN DOC REV: CPT | Performed by: INTERNAL MEDICINE

## 2022-05-27 PROCEDURE — 1123F ACP DISCUSS/DSCN MKR DOCD: CPT | Performed by: INTERNAL MEDICINE

## 2022-05-27 PROCEDURE — G8417 CALC BMI ABV UP PARAM F/U: HCPCS | Performed by: INTERNAL MEDICINE

## 2022-05-27 PROCEDURE — 99214 OFFICE O/P EST MOD 30 MIN: CPT | Performed by: INTERNAL MEDICINE

## 2022-05-27 PROCEDURE — 1036F TOBACCO NON-USER: CPT | Performed by: INTERNAL MEDICINE

## 2022-05-27 PROCEDURE — 93000 ELECTROCARDIOGRAM COMPLETE: CPT | Performed by: INTERNAL MEDICINE

## 2022-05-27 PROCEDURE — G8427 DOCREV CUR MEDS BY ELIG CLIN: HCPCS | Performed by: INTERNAL MEDICINE

## 2022-05-27 RX ORDER — HYDROCHLOROTHIAZIDE 25 MG/1
25 TABLET ORAL EVERY MORNING
Qty: 90 TABLET | Refills: 1 | Status: SHIPPED | OUTPATIENT
Start: 2022-05-27

## 2022-05-27 RX ORDER — ATORVASTATIN CALCIUM 40 MG/1
40 TABLET, FILM COATED ORAL DAILY
Qty: 90 TABLET | Refills: 3 | Status: SHIPPED | OUTPATIENT
Start: 2022-05-27

## 2022-05-27 RX ORDER — LISINOPRIL 5 MG/1
TABLET ORAL
Qty: 90 TABLET | Refills: 3 | Status: SHIPPED | OUTPATIENT
Start: 2022-05-27

## 2022-05-27 NOTE — PROGRESS NOTES
[address] [facility],[place] [address] [address] [facility] [address]  Dept: [phone]  Dept Fax: [phone]  Loc: [phone]    Visit Date: 5/27/2022    Mr. Graciela Chopra is a 69 y.o. male  who presented for:  Chief Complaint   Patient presents with    1 Year Follow Up       HPI:   75 yo M c hx of CAD s/p CABG on 3/8/19 (LIMA to LAD, HOOD to OM, SVG on RCA),HTN, HLD, NEL on CPAP and Left DVT. Denies any chest pain, sob, palpitations, lightheadedness, dizziness, orthopnea, PND or pedal edema. Plays golf daily. Current Outpatient Medications:     celecoxib (CELEBREX) 200 MG capsule, Take 200 mg by mouth daily, Disp: , Rfl:     lisinopril (PRINIVIL;ZESTRIL) 5 MG tablet, TAKE 1 TABLET BY MOUTH EVERY DAY, Disp: 90 tablet, Rfl: 3    aspirin 325 MG EC tablet, TAKE 1 TABLET BY MOUTH EVERY DAY, Disp: 90 tablet, Rfl: 3    atorvastatin (LIPITOR) 40 MG tablet, Take 1 tablet by mouth daily, Disp: 90 tablet, Rfl: 3    multivitamin-iron-minerals-folic acid (CENTRUM) chewable tablet, Take 1 tablet by mouth daily, Disp: , Rfl:     nitroGLYCERIN (NITROSTAT) 0.4 MG SL tablet, up to max of 3 total doses. If no relief after 1 dose, call 911., Disp: 25 tablet, Rfl: 3    Past Medical History  Kendra Meraz  has a past medical history of Blood clot in vein. Social History  Kendra Meraz  reports that he has never smoked. He has never used smokeless tobacco. He reports current alcohol use. He reports that he does not use drugs. Family History  Kendra Meraz family history includes Heart Attack in his father; Heart Failure in his brother; Heart Surgery in his father; Hypertension in his father and mother.     Past Surgical History   Past Surgical History:   Procedure Laterality Date    CARDIAC SURGERY      CORONARY ARTERY BYPASS GRAFT N/A 3/8/2019    CABG CORONARY ARTERY BYPASS X 4 WITH PAULA performed by Rich Pierre MD at [address] [place] [address] leg    UMBILICAL HERNIA REPAIR      VASCULAR SURGERY         Subjective:     REVIEW OF SYSTEMS  Constitutional: denies sweats, chills and fever  HENT: denies  congestion, sinus pressure, sneezing and sore throat. Eyes: denies  pain, discharge, redness and itching. Respiratory: denies apnea, cough  Gastrointestinal: denies blood in stool, constipation, diarrhea   Endocrine: denies cold intolerance, heat intolerance, polydipsia. Genitourinary: denies dysuria, enuresis, flank pain and hematuria. Musculoskeletal: denies arthralgias, joint swelling and neck pain. Neurological: denies numbness and headaches. Psychiatric/Behavioral: denies agitation, confusion, decreased concentration and dysphoric mood    All others reviewed and are negative. Objective:     BP (!) 159/81   Pulse 61   Ht 5' 11\" (1.803 m)   Wt 279 lb (126.6 kg)   BMI 38.91 kg/m²     Wt Readings from Last 3 Encounters:   05/27/22 279 lb (126.6 kg)   03/23/22 286 lb 3.2 oz (129.8 kg)   05/28/21 275 lb (124.7 kg)     BP Readings from Last 3 Encounters:   05/27/22 (!) 159/81   03/23/22 118/76   05/28/21 116/62       PHYSICAL EXAM  Constitutional: Oriented to person, place, and time. Appears well-developed and well-nourished. HENT:   Head: Normocephalic and atraumatic. Eyes: EOM are normal. Pupils are equal, round, and reactive to light. Neck: Normal range of motion. Neck supple. No JVD present. Cardiovascular: Normal rate , normal heart sounds and intact distal pulses. Pulmonary/Chest: Effort normal and breath sounds normal. No respiratory distress. No wheezes. No rales. Abdominal: Soft. Bowel sounds are normal. No distension. There is no tenderness. Musculoskeletal: Normal range of motion. No edema. Neurological: Alert and oriented to person, place, and time. No cranial nerve deficit. Coordination normal.   Skin: Skin is warm and dry. Psychiatric: Normal mood and affect.        No results found for: CKTOTAL, CKMB, CKMBINDEX    Lab Results   Component Value Date    WBC 9.3 04/05/2019    RBC 4.35 04/05/2019    HGB 12.6 04/05/2019    HCT 40.8 04/05/2019    MCV 93.8 04/05/2019    MCH 29.0 04/05/2019    MCHC 30.9 04/05/2019     04/05/2019    MPV 9.9 04/05/2019       Lab Results   Component Value Date     04/05/2019    K 4.6 04/05/2019    K 4.5 03/26/2019     04/05/2019    CO2 26 04/05/2019    BUN 18 04/05/2019    LABALBU 3.2 03/27/2019    CREATININE 1.1 04/05/2019    CALCIUM 9.0 04/05/2019    LABGLOM 67 04/05/2019    GLUCOSE 108 04/05/2019       Lab Results   Component Value Date    ALKPHOS 201 03/26/2019    ALT 25 03/26/2019    AST 22 03/26/2019    PROT 6.9 03/26/2019    BILITOT 0.6 03/26/2019    BILIDIR <0.2 03/05/2019    LABALBU 3.2 03/27/2019       Lab Results   Component Value Date    MG 2.2 03/12/2019       Lab Results   Component Value Date    INR 1.14 (H) 03/26/2019    INR 1.04 03/05/2019    INR 0.98 03/05/2019         Lab Results   Component Value Date    LABA1C 5.4 03/05/2019       No results found for: TRIG, HDL, LDLCALC, LDLDIRECT, LABVLDL    No results found for: TSH      Testing Reviewed:      I haveindividually reviewed the below cardiac tests    EKG:    ECHO:   Results for orders placed during the hospital encounter of 03/05/19   Echocardiogram 2D/ M-Mode/ Colorflow/ Do    Narrative Transthoracic Echocardiography Report (TTE)     Demographics      Patient Name    Leida Marroquin Gender                Male      MR #            [id number]      Race                                                       Ethnicity      Account #       [de-identified]      Room Number           0012      Accession       [id number]      Date of Study         03/05/2019   Number      Date of Birth   1955     Referring Physician   Maria Elena Penny MD      Age             70 year(s)     [address] [place] Interpreting          Jesus Lambert MD                                  Physician     Procedure    Type of Study      TTE procedure:ECHOCARDIOGRAM COMPLETE 2D W DOPPLER W COLOR. Procedure Date  Date: 03/05/2019 Start: 08:06 AM    Study Location: [facility]  Technical Quality: Limited visualization due to body habitus. Indications:Dyspnea / Shortness of breath. Additional Medical History:Hypertension, hyperlipidemia, sleep apnea,  shortness of breath, morbid obesity    Patient Status: Routine    Height: 70.08 inches Weight: 271.17 pounds BSA: 2.38 m^2 BMI: 38.82 kg/m^2    BP: 130/72 mmHg    Allergies    - No Known Allergies. Conclusions      Summary   Technically difficult examination. Normal left ventricle size and systolic function. Ejection fraction was   estimated at 55-60%. There were no regional left ventricular wall motion   abnormalities and wall thickness was within normal limits. The right ventricular size was normal with normal systolic function and   wall thickness. Signature      ----------------------------------------------------------------   Electronically signed by Jesus Lambert MD (Interpreting   physician) on 03/05/2019 at 08:59 AM   ----------------------------------------------------------------      Findings      Mitral Valve   The mitral valve structure was normal with normal leaflet separation. DOPPLER: The transmitral velocity was within the normal range with no   evidence for mitral stenosis. There was no evidence of mitral   regurgitation. Aortic Valve   The aortic valve leaflets were not well visualized. The aortic valve appears to be trileaflet with good leaflet separation. DOPPLER: Transaortic velocity was within the normal range with no evidence   of aortic stenosis. There was no evidence of aortic regurgitation. Tricuspid Valve   The tricuspid valve structure was normal with normal leaflet separation.    DOPPLER: There was no evidence of tricuspid stenosis. There was no   evidence of tricuspid regurgitation. Pulmonic Valve   The pulmonic valve leaflets exhibited normal thickness, no calcification,   and normal cuspal separation. DOPPLER: The transpulmonic velocity was   within the normal range with no evidence for regurgitation. Left Atrium   Left atrial size was normal.      Left Ventricle   Normal left ventricle size and systolic function. Ejection fraction was   estimated at 55-60%. There were no regional left ventricular wall motion   abnormalities and wall thickness was within normal limits. Right Atrium   Right atrial size was normal.      Right Ventricle   The right ventricular size was normal with normal systolic function and   wall thickness. Pericardial Effusion   The pericardium was normal in appearance with no evidence of a pericardial   effusion. Pleural Effusion   No evidence of pleural effusion. Aorta / Great Vessels   -Aortic root dimension within normal limits. -IVC not well visualized.      M-Mode/2D Measurements & Calculations      LV Diastolic    LV Systolic Dimension: 3.3   AV Cusp Separation: 2 cmLA   Dimension: 4.8  cm                           Dimension: 3.7 cmAO Root   cm              LV Volume Diastolic: 244 ml  Dimension: 3.1 cm   LV FS:31.3 %    LV Volume Systolic: 39.2 ml   LV PW           LV EDV/LV EDV Index: 596   Diastolic: 0.8  OQ/54 G^3XK ESV/LV ESV   cm              Index: 44.1 ml/19 m^2        RV Diastolic Dimension: 2.4   Septum          EF Calculated: 59.2 %        cm   Diastolic: 0.9   cm                                           LA/Aorta: 1.19     Doppler Measurements & Calculations      MV Peak E-Wave: 69.9 cm/s   AV Peak Velocity: 152  LVOT Peak Velocity: 110   MV Peak A-Wave: 87.3 cm/s   cm/s                   cm/s   MV E/A Ratio: 0.8           AV Peak Gradient: 9.24 LVOT Peak Gradient: 5   MV Peak Gradient: 1.95 mmHg mmHg                   mmHg      MV Deceleration Time: 232 TV Peak E-Wave: 43 cm/s   msec                                               TV Peak A-Wave: 49 cm/s                                  IVRT: 92 msec          TV Peak Gradient: 0.74   MV E' Septal Velocity: 8.8                         mmHg   cm/s   MV A' Septal Velocity: 13.6 AV DVI (Vmax):0.72     PV Peak Velocity: 73.1   cm/s                                               cm/s   MV E' Lateral Velocity: 8.1                        PV Peak Gradient: 2.14   cm/s                                               mmHg   MV A' Lateral Velocity:   13.1 cm/s   E/E' septal: 7.94   E/E' lateral: 8.63     [id number]       STRESS:    CATH:    Assessment/Plan       Diagnosis Orders   1. Coronary artery disease due to lipid rich plaque  EKG 12 Lead       CAD s/p CABG x 3 (3/8/19)  HTN-uncontrolled  Pedal edema  HLD  NEL on CPAP     Reviewed EKG  Had DVT of left leg   Was temporarily on eliquis  Continue statin  His bradycardia improved, stopped BB last visit  Continue without BB. Will add hctz 25mg daily  The patient is asked to make an attempt to improve diet and exercise patterns to aid in medical management of this problem. Advised more plant based nutrition/meditarrean diet   Advised patient to call office or seek immediate medical attention if there is any new onset of  any chest pain, sob, palpitations, lightheadedness, dizziness, orthopnea, PND or pedal edema. All medication side effects were discussed in details. Thank youfor allowing me to participate in the care of this patient. Please do not hesitate to contact me for any further questions. Return in about 1 year (around 5/28/2022), or if symptoms worsen or fail to improve, for Regular follow up, Review testing.        Electronically signed by Tawanda Zarate MD [facility]  5/27/2022 at 10:02 AM

## 2022-06-22 ENCOUNTER — OFFICE VISIT (OUTPATIENT)
Dept: PULMONOLOGY | Age: 67
End: 2022-06-22
Payer: MEDICARE

## 2022-06-22 VITALS
DIASTOLIC BLOOD PRESSURE: 70 MMHG | OXYGEN SATURATION: 98 % | TEMPERATURE: 98 F | WEIGHT: 270.2 LBS | SYSTOLIC BLOOD PRESSURE: 124 MMHG | HEIGHT: 71 IN | HEART RATE: 69 BPM | BODY MASS INDEX: 37.83 KG/M2

## 2022-06-22 DIAGNOSIS — R91.1 RIGHT LOWER LOBE PULMONARY NODULE: ICD-10-CM

## 2022-06-22 PROCEDURE — 1036F TOBACCO NON-USER: CPT | Performed by: NURSE PRACTITIONER

## 2022-06-22 PROCEDURE — G8427 DOCREV CUR MEDS BY ELIG CLIN: HCPCS | Performed by: NURSE PRACTITIONER

## 2022-06-22 PROCEDURE — 1123F ACP DISCUSS/DSCN MKR DOCD: CPT | Performed by: NURSE PRACTITIONER

## 2022-06-22 PROCEDURE — 3017F COLORECTAL CA SCREEN DOC REV: CPT | Performed by: NURSE PRACTITIONER

## 2022-06-22 PROCEDURE — G8417 CALC BMI ABV UP PARAM F/U: HCPCS | Performed by: NURSE PRACTITIONER

## 2022-06-22 PROCEDURE — 99203 OFFICE O/P NEW LOW 30 MIN: CPT | Performed by: NURSE PRACTITIONER

## 2022-06-22 ASSESSMENT — ENCOUNTER SYMPTOMS
WHEEZING: 0
DIARRHEA: 0
VOMITING: 0
NAUSEA: 0
EYES NEGATIVE: 1
CHEST TIGHTNESS: 0
ALLERGIC/IMMUNOLOGIC NEGATIVE: 1
STRIDOR: 0
RESPIRATORY NEGATIVE: 1
GASTROINTESTINAL NEGATIVE: 1

## 2022-06-22 NOTE — PROGRESS NOTES
[facility]    Patient: Ladan Live, 69 y.o.   : 1955  2022    Pt of Dr. Sarah Townsend   Patient presents with    Follow-up     3 year Pulmonary follow up, needs to get re-established         HPI  Dhruv Hughes is here for follow up for stable RLL 8.9mm nodule. HX of DVT previously on Eliquis since stopped   Previously seen per Dr. Ta Manual in 3/2019 during an inpatient stay- then consulted for R hilar mass at that time discussed with Radiologist and decided there was NO R hilar mass   Repeat Ct Chest w/o done 2019 and then still stable RLL nodule - no further imaging done   Recent PAP titration study to be on CPAP at 12 cm H2O   Never a smoker   No PFTs on file   No inhaler use   Plays golf daily  Cardiology recent office note reviewed - added HCTZ at visit; BB stopped   No SOB, cough or wheezing     Progress History:   Since last visit any new medical issues? No  New ER or hospital visits? No  Any new or changes in medicines? No  Using inhalers? No  Are they helpful?  No  Flu vaccine No  No Covid vaccinations   Pneumonia vaccine NO  Past Medical hx   PMH:  Past Medical History:   Diagnosis Date    Blood clot in vein     right leg     SURGICAL HISTORY:  Past Surgical History:   Procedure Laterality Date    CARDIAC SURGERY      CORONARY ARTERY BYPASS GRAFT N/A 3/8/2019    CABG CORONARY ARTERY BYPASS X 4 WITH PAULA performed by Nevaeh Sin MD at [address] [place] [address]      rt leg    UMBILICAL HERNIA REPAIR      VASCULAR SURGERY       SOCIAL HISTORY:  Social History     Tobacco Use    Smoking status: Never Smoker    Smokeless tobacco: Never Used   Vaping Use    Vaping Use: Never used   Substance Use Topics    Alcohol use: Yes     Comment: Occasional    Drug use: Never     ALLERGIES:No Known Allergies  FAMILY HISTORY:  Family History   Problem Relation Age of Onset    Hypertension Mother     Heart Surgery Father  Heart Attack Father     Hypertension Father     Heart Failure Brother      CURRENT MEDICATIONS:  Current Outpatient Medications   Medication Sig Dispense Refill    lisinopril (PRINIVIL;ZESTRIL) 5 MG tablet TAKE 1 TABLET BY MOUTH EVERY DAY 90 tablet 3    atorvastatin (LIPITOR) 40 MG tablet Take 1 tablet by mouth daily 90 tablet 3    aspirin 325 mg EC tablet TAKE 1 TABLET BY MOUTH EVERY DAY 90 tablet 3    hydroCHLOROthiazide (HYDRODIURIL) 25 MG tablet Take 1 tablet by mouth every morning 90 tablet 1    celecoxib (CELEBREX) 200 MG capsule Take 200 mg by mouth daily      multivitamin-iron-minerals-folic acid (CENTRUM) chewable tablet Take 1 tablet by mouth daily      nitroGLYCERIN (NITROSTAT) 0.4 MG SL tablet up to max of 3 total doses. If no relief after 1 dose, call 911. 25 tablet 3     No current facility-administered medications for this visit. ROS   Review of Systems   Constitutional: Negative. Negative for chills, fever and unexpected weight change. HENT: Negative. Eyes: Negative. Respiratory: Negative. Negative for chest tightness, wheezing and stridor. Cardiovascular: Negative for chest pain and leg swelling. Gastrointestinal: Negative. Negative for diarrhea, nausea and vomiting. Endocrine: Negative. Genitourinary: Negative. Negative for dysuria. Musculoskeletal: Negative. Skin: Negative. Allergic/Immunologic: Negative. Neurological: Negative. Hematological: Negative. Psychiatric/Behavioral: Negative. Physical exam   /70   Pulse 69   Temp 98 °F (36.7 °C)   Ht 5' 11\" (1.803 m)   Wt 270 lb 3.2 oz (122.6 kg)   SpO2 98% Comment: r/a  BMI 37.69 kg/m²    Wt Readings from Last 3 Encounters:   06/22/22 270 lb 3.2 oz (122.6 kg)   05/27/22 279 lb (126.6 kg)   03/23/22 286 lb 3.2 oz (129.8 kg)       Physical Exam  Vitals and nursing note reviewed. Constitutional:       General: He is not in acute distress.      Appearance: He is well-developed. He is obese. HENT:      Head: Normocephalic and atraumatic. Neck:      Trachea: No tracheal deviation. Cardiovascular:      Rate and Rhythm: Normal rate and regular rhythm. Heart sounds: Normal heart sounds. No murmur heard. Pulmonary:      Effort: Pulmonary effort is normal. No respiratory distress. Breath sounds: Normal breath sounds. No stridor. No wheezing or rales. Chest:      Chest wall: No tenderness. Abdominal:      General: Bowel sounds are normal. There is no distension. Palpations: Abdomen is soft. Skin:     General: Skin is warm and dry. Capillary Refill: Capillary refill takes less than 2 seconds. Neurological:      Mental Status: He is alert and oriented to person, place, and time. Psychiatric:         Behavior: Behavior normal.         Thought Content: Thought content normal.         Judgment: Judgment normal.          results   Lung Nodule Screening     [] Qualifies    [x] Does not qualify   [] Declined    [] Completed  The USPSTF recommends annual screening for lung cancer with low-dose computed tomography (LDCT) in adults aged 48 to [de-identified] years who have a 30 pack-year smoking history and currently smoke or have quit within the past 20 years. Screening should be discontinued once a person has not smoked for 20 years or develops a health problem that substantially limits life expectancy or the ability or willingness to have curative lung surgery. 7/2/2019   CT CHEST WO CONTRAST   FINDINGS:       The heart size is normal. No pericardial effusion. There is mild coronary calcification. There are epicardial pacing wires. The aorta is of normal caliber. There is no gross abnormality of the pulmonary artery. There is no mediastinal, axillary or hilar adenopathy. There are no pleural effusions. Focal pleural thickening anteriorly within the middle lobe is slightly less conspicuous. Mild middle lobe scarring. No acute infiltrate.    8 mm lobulated right lower lobe nodule or group of 2 adjacent nodules is grossly similar to previous. 3 mm left lower lobe nodule may have been present previously though obscured by adjacent atelectasis. There is a median sternotomy. No significant bony bridging is evident. Mild adjacent swelling is limited to be correlated clinically. Small nodule right lower lobe is grossly stable since 3/26/2019         Assessment      Diagnosis Orders   1.  Right lower lobe pulmonary nodule  CT CHEST WO CONTRAST         Plan   -CT Chest w/o to follow up on known RLL pulmonary nodule   -Advised to maintain pneumonia vaccine with PCP and to take flu vaccine this coming season.  -Advised patient to call office with any changes, questions, or concerns regarding respiratory status  -If RLL nodule stable patient can follow with Pulmonary PRN  -Continue Sleep Medicine follow ups as scheduled     Will see Susy Castillo back in: 1 months    Rafa Geronimo, [facility]  6/22/2022

## 2022-07-22 ENCOUNTER — HOSPITAL ENCOUNTER (OUTPATIENT)
Dept: CT IMAGING | Age: 67
Discharge: HOME | End: 2022-07-22
Payer: MEDICARE

## 2022-07-22 DIAGNOSIS — R91.1 RIGHT LOWER LOBE PULMONARY NODULE: ICD-10-CM

## 2022-07-22 PROCEDURE — 71250 CT THORAX DX C-: CPT

## 2022-07-28 ENCOUNTER — OFFICE VISIT (OUTPATIENT)
Dept: PULMONOLOGY | Age: 67
End: 2022-07-28
Payer: MEDICARE

## 2022-07-28 VITALS
HEIGHT: 71 IN | WEIGHT: 267 LBS | TEMPERATURE: 97.5 F | BODY MASS INDEX: 37.38 KG/M2 | OXYGEN SATURATION: 95 % | HEART RATE: 85 BPM | SYSTOLIC BLOOD PRESSURE: 124 MMHG | DIASTOLIC BLOOD PRESSURE: 72 MMHG

## 2022-07-28 DIAGNOSIS — R91.1 RIGHT LOWER LOBE PULMONARY NODULE: Primary | ICD-10-CM

## 2022-07-28 PROCEDURE — 99213 OFFICE O/P EST LOW 20 MIN: CPT | Performed by: NURSE PRACTITIONER

## 2022-07-28 PROCEDURE — 1036F TOBACCO NON-USER: CPT | Performed by: NURSE PRACTITIONER

## 2022-07-28 PROCEDURE — 1123F ACP DISCUSS/DSCN MKR DOCD: CPT | Performed by: NURSE PRACTITIONER

## 2022-07-28 PROCEDURE — 3017F COLORECTAL CA SCREEN DOC REV: CPT | Performed by: NURSE PRACTITIONER

## 2022-07-28 PROCEDURE — G8417 CALC BMI ABV UP PARAM F/U: HCPCS | Performed by: NURSE PRACTITIONER

## 2022-07-28 PROCEDURE — G8427 DOCREV CUR MEDS BY ELIG CLIN: HCPCS | Performed by: NURSE PRACTITIONER

## 2022-07-28 ASSESSMENT — ENCOUNTER SYMPTOMS
ALLERGIC/IMMUNOLOGIC NEGATIVE: 1
WHEEZING: 0
RESPIRATORY NEGATIVE: 1
CHEST TIGHTNESS: 0
STRIDOR: 0
GASTROINTESTINAL NEGATIVE: 1
NAUSEA: 0
DIARRHEA: 0
VOMITING: 0
EYES NEGATIVE: 1

## 2022-07-28 NOTE — PROGRESS NOTES
Patient is experiencing SOB: no    Patient is experiencing wheezing: No    Patient states they have had a Absent = 4 cough. Phlegm is none    Patient is coughing up blood: no    Patient has been experiencing chest pains: non-existent    Other: No concerns.

## 2022-07-28 NOTE — PROGRESS NOTES
[facility]    Patient: Tyrese Arias, 69 y.o.   : 1955  2022    Pt of Dr. Steve Romano   Patient presents with    Follow-up     1 month nodule with CT on 22        HPI  Kristy Okeefe is here for follow up for RLL pulmonary nodule . Patient is experiencing SOB: no     Patient is experiencing wheezing: No     Patient states they have had a Absent = 4 cough. Phlegm is none     Patient is coughing up blood: no     Patient has been experiencing chest pains: non-existent     Other: No concerns. Progress History:   Since last visit any new medical issues? No  New ER or hospital visits? No  Any new or changes in medicines? No  Using inhalers? No  Are they helpful? No  Flu vaccine? Pneumonia vaccine?   Past Medical hx   PMH:  Past Medical History:   Diagnosis Date    Blood clot in vein     right leg     SURGICAL HISTORY:  Past Surgical History:   Procedure Laterality Date    CARDIAC SURGERY      CORONARY ARTERY BYPASS GRAFT N/A 3/8/2019    CABG CORONARY ARTERY BYPASS X 4 WITH PAULA performed by Deneen Ornelas MD at [place] [address]      rt leg    UMBILICAL HERNIA REPAIR      VASCULAR SURGERY       SOCIAL HISTORY:  Social History     Tobacco Use    Smoking status: Never    Smokeless tobacco: Never   Vaping Use    Vaping Use: Never used   Substance Use Topics    Alcohol use: Yes     Comment: Occasional    Drug use: Never     ALLERGIES:No Known Allergies  FAMILY HISTORY:  Family History   Problem Relation Age of Onset    Hypertension Mother     Heart Surgery Father     Heart Attack Father     Hypertension Father     Heart Failure Brother      CURRENT MEDICATIONS:  Current Outpatient Medications   Medication Sig Dispense Refill    lisinopril (PRINIVIL;ZESTRIL) 5 MG tablet TAKE 1 TABLET BY MOUTH EVERY DAY 90 tablet 3    atorvastatin (LIPITOR) 40 MG tablet Take 1 tablet by mouth daily 90 tablet 3    aspirin 325 mg EC tablet TAKE 1 TABLET BY MOUTH EVERY DAY 90 tablet 3    hydroCHLOROthiazide (HYDRODIURIL) 25 MG tablet Take 1 tablet by mouth every morning 90 tablet 1    celecoxib (CELEBREX) 200 MG capsule Take 200 mg by mouth daily      multivitamin-iron-minerals-folic acid (CENTRUM) chewable tablet Take 1 tablet by mouth daily      nitroGLYCERIN (NITROSTAT) 0.4 MG SL tablet up to max of 3 total doses. If no relief after 1 dose, call 911. 25 tablet 3     No current facility-administered medications for this visit. ROS   Review of Systems   Constitutional: Negative. Negative for chills, fever and unexpected weight change. HENT: Negative. Eyes: Negative. Respiratory: Negative. Negative for chest tightness, wheezing and stridor. Cardiovascular:  Negative for chest pain and leg swelling. Gastrointestinal: Negative. Negative for diarrhea, nausea and vomiting. Endocrine: Negative. Genitourinary: Negative. Negative for dysuria. Musculoskeletal: Negative. Skin: Negative. Allergic/Immunologic: Negative. Neurological: Negative. Hematological: Negative. Psychiatric/Behavioral: Negative. Physical exam   /72 (Site: Left Upper Arm, Position: Sitting, Cuff Size: Medium Adult)   Pulse 85   Temp 97.5 °F (36.4 °C) (Skin)   Ht 5' 11\" (1.803 m)   Wt 267 lb (121.1 kg)   SpO2 95%   BMI 37.24 kg/m²    Wt Readings from Last 3 Encounters:   07/28/22 267 lb (121.1 kg)   06/22/22 270 lb 3.2 oz (122.6 kg)   05/27/22 279 lb (126.6 kg)       Physical Exam  Vitals and nursing note reviewed. Constitutional:       General: He is not in acute distress. Appearance: He is well-developed. He is obese. HENT:      Head: Normocephalic and atraumatic. Neck:      Trachea: No tracheal deviation. Cardiovascular:      Rate and Rhythm: Normal rate and regular rhythm. Heart sounds: Normal heart sounds. No murmur heard. Pulmonary:      Effort: Pulmonary effort is normal. No respiratory distress. Breath sounds: Normal breath sounds. No stridor. No wheezing or rales. Chest:      Chest wall: No tenderness. Abdominal:      General: Bowel sounds are normal. There is no distension. Palpations: Abdomen is soft. Skin:     General: Skin is warm and dry. Capillary Refill: Capillary refill takes less than 2 seconds. Neurological:      Mental Status: He is alert and oriented to person, place, and time. Psychiatric:         Behavior: Behavior normal.         Thought Content: Thought content normal.         Judgment: Judgment normal.        results   Lung Nodule Screening     [] Qualifies    [x] Does not qualify   [] Declined    [] Completed    The USPSTF recommends annual screening for lung cancer with low-dose computed tomography (LDCT) in adults aged 48 to [de-identified] years who have a 30 pack-year smoking history and currently smoke or have quit within the past 20 years. Screening should be discontinued once a person has not smoked for 20 years or develops a health problem that substantially limits life expectancy or the ability or willingness to have curative lung surgery. 7/22/2022     CT CHEST WO CONTRAST     FINDINGS: Heart/mediastinum: The thyroid gland is unremarkable. The heart size is normal. Coronary artery calcifications are observed. No pericardial effusion is identified. The aorta is not dilated. No mediastinal, hilar, or axillary lymphadenopathy is observed. Lungs: A 9 mm bilobed right lower lobe pulmonary nodule is stable (series 2, image 43). A 3 mm left lower lobe pulmonary nodule is unchanged (series 2, image 53). No new pulmonary mass or nodule is observed. No focal consolidation, pleural effusion, or pneumothorax is identified. The central airways are patent and unremarkable bilaterally. Upper abdomen: No acute findings are noted in the limited images through the upper abdomen. Musculoskeletal: Diffuse osteopenia is present. The visualized skeletal structures appear intact.

## 2022-08-03 ENCOUNTER — TELEPHONE (OUTPATIENT)
Dept: PULMONOLOGY | Age: 67
End: 2022-08-03

## 2022-08-03 NOTE — TELEPHONE ENCOUNTER
Called patient to rs his appt because he still does not have machine. Unable to reach patient left voicemail to call back.

## 2022-11-21 ENCOUNTER — TELEPHONE (OUTPATIENT)
Dept: PULMONOLOGY | Age: 67
End: 2022-11-21

## 2022-11-28 RX ORDER — HYDROCHLOROTHIAZIDE 25 MG/1
TABLET ORAL
Qty: 90 TABLET | Refills: 1 | Status: SHIPPED | OUTPATIENT
Start: 2022-11-28

## 2023-01-18 ENCOUNTER — OFFICE VISIT (OUTPATIENT)
Dept: PULMONOLOGY | Age: 68
End: 2023-01-18
Payer: MEDICARE

## 2023-01-18 VITALS
BODY MASS INDEX: 39.2 KG/M2 | DIASTOLIC BLOOD PRESSURE: 60 MMHG | HEART RATE: 52 BPM | TEMPERATURE: 97.5 F | OXYGEN SATURATION: 98 % | SYSTOLIC BLOOD PRESSURE: 118 MMHG | HEIGHT: 70 IN | WEIGHT: 273.8 LBS

## 2023-01-18 DIAGNOSIS — E66.9 OBESITY WITH BODY MASS INDEX OF 30.0-39.9: ICD-10-CM

## 2023-01-18 DIAGNOSIS — Z99.89 OSA ON CPAP: Primary | ICD-10-CM

## 2023-01-18 DIAGNOSIS — G47.33 OSA ON CPAP: Primary | ICD-10-CM

## 2023-01-18 PROCEDURE — G8427 DOCREV CUR MEDS BY ELIG CLIN: HCPCS | Performed by: NURSE PRACTITIONER

## 2023-01-18 PROCEDURE — 99214 OFFICE O/P EST MOD 30 MIN: CPT | Performed by: NURSE PRACTITIONER

## 2023-01-18 PROCEDURE — 3017F COLORECTAL CA SCREEN DOC REV: CPT | Performed by: NURSE PRACTITIONER

## 2023-01-18 PROCEDURE — G8484 FLU IMMUNIZE NO ADMIN: HCPCS | Performed by: NURSE PRACTITIONER

## 2023-01-18 PROCEDURE — 1036F TOBACCO NON-USER: CPT | Performed by: NURSE PRACTITIONER

## 2023-01-18 PROCEDURE — 1123F ACP DISCUSS/DSCN MKR DOCD: CPT | Performed by: NURSE PRACTITIONER

## 2023-01-18 PROCEDURE — 3074F SYST BP LT 130 MM HG: CPT | Performed by: NURSE PRACTITIONER

## 2023-01-18 PROCEDURE — G8417 CALC BMI ABV UP PARAM F/U: HCPCS | Performed by: NURSE PRACTITIONER

## 2023-01-18 PROCEDURE — 3078F DIAST BP <80 MM HG: CPT | Performed by: NURSE PRACTITIONER

## 2023-01-18 ASSESSMENT — ENCOUNTER SYMPTOMS
VOMITING: 0
CHEST TIGHTNESS: 0
ALLERGIC/IMMUNOLOGIC NEGATIVE: 1
RESPIRATORY NEGATIVE: 1
NAUSEA: 0
EYES NEGATIVE: 1
GASTROINTESTINAL NEGATIVE: 1
STRIDOR: 0
DIARRHEA: 0
WHEEZING: 0

## 2023-01-18 NOTE — PROGRESS NOTES
[facility]      Fozia Lima         [id number]  1/18/2023   Chief Complaint   Patient presents with    Follow-up     F/u after set up        Pt of Dr. Price Zee    PAP Download:   Original or initial AHI: 36.1     Date of initial study: 7/29/2001      Compliant  100%     Noncompliant 0 %     PAP Type cpap Level  12   Avg Hrs/Day 7hr 9min  AHI: 1.2   Recorded compliance dates , 12/18/22-1/16/23     Machine/Mfg:   [x] ResMed    [] Respironics/Dreamstation   Interface:   [] Nasal    [x] Nasal pillows   [] FFM      Provider:      [x] [facility]     [][employer]     [] [employer]    [] [facility]    [] [facility]               [] [facility]      [] [employer]    [] [address]      [] Other    Neck Size: 18  Mallampati 4  ESS:  6  SAQLI: 83    Here is a scan of the most recent download:                Presentation:   Jarek Soto presents for sleep medicine follow up for obstructive sleep apnea  Since the last visit, Jarek Soto has been compliant with his PAP therapy and is seeing benefit from its use. Awake and alert today  BMI 39  No sleep medication use       Weight stable / unchanged    Equipment issues: The pressure is  acceptable, the mask is acceptable     Sleep issues:  Do you feel better? Yes  More rested? Yes   Better concentration? NA  Difficulty falling sleep? No  Difficulty staying asleep? No  Snoring? No    Progress History:   Since last visit any new medical issues? No  New ER or hospital visits? No  Any new or changes in medicines? No  Any new sleep medicines? No    Review of Systems -   Review of Systems   Constitutional: Negative. Negative for chills, fever and unexpected weight change. HENT: Negative. Eyes: Negative. Respiratory: Negative. Negative for chest tightness, wheezing and stridor. Cardiovascular:  Negative for chest pain and leg swelling. Gastrointestinal: Negative. Negative for diarrhea, nausea and vomiting. Endocrine: Negative.     Genitourinary: Negative. Negative for dysuria. Musculoskeletal: Negative. Skin: Negative. Allergic/Immunologic: Negative. Neurological: Negative. Hematological: Negative. Psychiatric/Behavioral: Negative. Physical Exam:    BMI:  Body mass index is 39.29 kg/m². Wt Readings from Last 3 Encounters:   01/18/23 273 lb 12.8 oz (124.2 kg)   07/28/22 267 lb (121.1 kg)   06/22/22 270 lb 3.2 oz (122.6 kg)     Vitals: /60 (Site: Left Upper Arm, Position: Sitting, Cuff Size: Large Adult)   Pulse 52   Temp 97.5 °F (36.4 °C) (Infrared)   Ht 5' 10\" (1.778 m)   Wt 273 lb 12.8 oz (124.2 kg)   SpO2 98%   BMI 39.29 kg/m²       Physical Exam  Vitals and nursing note reviewed. Constitutional:       General: He is not in acute distress. Appearance: He is well-developed. He is obese. HENT:      Head: Normocephalic and atraumatic. Neck:      Trachea: No tracheal deviation. Cardiovascular:      Rate and Rhythm: Normal rate and regular rhythm. Heart sounds: Normal heart sounds. No murmur heard. Pulmonary:      Effort: Pulmonary effort is normal. No respiratory distress. Breath sounds: Normal breath sounds. No stridor. No wheezing or rales. Chest:      Chest wall: No tenderness. Abdominal:      General: Bowel sounds are normal. There is no distension. Palpations: Abdomen is soft. Skin:     General: Skin is warm and dry. Capillary Refill: Capillary refill takes less than 2 seconds. Neurological:      Mental Status: He is alert and oriented to person, place, and time. Psychiatric:         Behavior: Behavior normal.         Thought Content: Thought content normal.         Judgment: Judgment normal.         ASSESSMENT/DIAGNOSIS     Diagnosis Orders   1. NEL on CPAP  DME Order for CPAP as OP      2. Obesity with body mass index of 30.0-39.9                 Plan   Do you need any equipment today?  Yes   -Patient's symptoms and AHI are controlled with current settings  -Download reviewed with Jakub Borrego and myself today in the office   -Advised to continue current positive airway pressure therapy with above described pressure. -Advised to keep good compliance with current recommended pressure to get optimal results and clinical improvement  -Recommend 7-9 hours of sleep with PAP  -Instructed to call [employer] regarding supplies if needed.   -Patient to call office for earlier appointment if needed for worsening of sleep symptoms. -Advised patient not to drive if feeling sleepy  -Discussed weight loss and reaching out to PCP for further weight loss options   -Educated about my impression and plan. Patient verbalizes understanding. Will see Jazmine Green back in: 1 year with download. Information added by my medical assistant/LPN was reviewed today.     Electronically signed by EMERY Cope CNP on 1/18/2023 at 2:12 PM

## 2023-04-06 RX ORDER — HYDROCHLOROTHIAZIDE 25 MG/1
TABLET ORAL
Qty: 90 TABLET | Refills: 1 | OUTPATIENT
Start: 2023-04-06

## 2023-04-06 RX ORDER — HYDROCHLOROTHIAZIDE 25 MG/1
25 TABLET ORAL EVERY MORNING
Qty: 90 TABLET | Refills: 0 | Status: SHIPPED | OUTPATIENT
Start: 2023-04-06

## 2023-04-06 RX ORDER — LISINOPRIL 5 MG/1
TABLET ORAL
Qty: 90 TABLET | Refills: 0 | Status: SHIPPED | OUTPATIENT
Start: 2023-04-06

## 2023-04-06 RX ORDER — ATORVASTATIN CALCIUM 40 MG/1
TABLET, FILM COATED ORAL
Qty: 90 TABLET | Refills: 0 | Status: SHIPPED | OUTPATIENT
Start: 2023-04-06

## 2023-06-02 ENCOUNTER — OFFICE VISIT (OUTPATIENT)
Dept: CARDIOLOGY CLINIC | Age: 68
End: 2023-06-02

## 2023-06-02 VITALS
BODY MASS INDEX: 37.38 KG/M2 | HEIGHT: 71 IN | SYSTOLIC BLOOD PRESSURE: 116 MMHG | WEIGHT: 267 LBS | DIASTOLIC BLOOD PRESSURE: 60 MMHG | HEART RATE: 62 BPM

## 2023-06-02 DIAGNOSIS — I25.10 CORONARY ARTERY DISEASE DUE TO LIPID RICH PLAQUE: Primary | ICD-10-CM

## 2023-06-02 DIAGNOSIS — I25.83 CORONARY ARTERY DISEASE DUE TO LIPID RICH PLAQUE: Primary | ICD-10-CM

## 2023-06-02 RX ORDER — LISINOPRIL 5 MG/1
5 TABLET ORAL DAILY
Qty: 90 TABLET | Refills: 3 | Status: SHIPPED | OUTPATIENT
Start: 2023-06-02

## 2023-06-02 RX ORDER — HYDROCHLOROTHIAZIDE 25 MG/1
25 TABLET ORAL EVERY MORNING
Qty: 90 TABLET | Refills: 3 | Status: SHIPPED | OUTPATIENT
Start: 2023-06-02

## 2023-06-02 RX ORDER — ATORVASTATIN CALCIUM 40 MG/1
40 TABLET, FILM COATED ORAL DAILY
Qty: 90 TABLET | Refills: 3 | Status: SHIPPED | OUTPATIENT
Start: 2023-06-02

## 2023-06-02 NOTE — PROGRESS NOTES
well visualized. The aortic valve appears to be trileaflet with good leaflet separation. DOPPLER: Transaortic velocity was within the normal range with no evidence   of aortic stenosis. There was no evidence of aortic regurgitation. Tricuspid Valve   The tricuspid valve structure was normal with normal leaflet separation. DOPPLER: There was no evidence of tricuspid stenosis. There was no   evidence of tricuspid regurgitation. Pulmonic Valve   The pulmonic valve leaflets exhibited normal thickness, no calcification,   and normal cuspal separation. DOPPLER: The transpulmonic velocity was   within the normal range with no evidence for regurgitation. Left Atrium   Left atrial size was normal.      Left Ventricle   Normal left ventricle size and systolic function. Ejection fraction was   estimated at 55-60%. There were no regional left ventricular wall motion   abnormalities and wall thickness was within normal limits. Right Atrium   Right atrial size was normal.      Right Ventricle   The right ventricular size was normal with normal systolic function and   wall thickness. Pericardial Effusion   The pericardium was normal in appearance with no evidence of a pericardial   effusion. Pleural Effusion   No evidence of pleural effusion. Aorta / Great Vessels   -Aortic root dimension within normal limits. -IVC not well visualized.      M-Mode/2D Measurements & Calculations      LV Diastolic    LV Systolic Dimension: 3.3   AV Cusp Separation: 2 cmLA   Dimension: 4.8  cm                           Dimension: 3.7 cmAO Root   cm              LV Volume Diastolic: 635 ml  Dimension: 3.1 cm   LV FS:31.3 %    LV Volume Systolic: 35.3 ml   LV PW           LV EDV/LV EDV Index: 346   Diastolic: 0.8  OU/25 E^9QE ESV/LV ESV   cm              Index: 44.1 ml/19 m^2        RV Diastolic Dimension: 2.4   Septum          EF Calculated: 59.2 %        cm   Diastolic: 0.9   cm

## 2023-07-05 RX ORDER — ASPIRIN 325 MG
TABLET, DELAYED RELEASE (ENTERIC COATED) ORAL
Qty: 90 TABLET | Refills: 3 | Status: SHIPPED | OUTPATIENT
Start: 2023-07-05

## 2023-07-31 ASSESSMENT — ENCOUNTER SYMPTOMS
DIARRHEA: 0
RESPIRATORY NEGATIVE: 1
NAUSEA: 0
WHEEZING: 0
EYES NEGATIVE: 1
GASTROINTESTINAL NEGATIVE: 1
CHEST TIGHTNESS: 0
STRIDOR: 0
VOMITING: 0
ALLERGIC/IMMUNOLOGIC NEGATIVE: 1

## 2023-07-31 NOTE — PROGRESS NOTES
Heart sounds: Normal heart sounds. No murmur heard. Pulmonary:      Effort: Pulmonary effort is normal. No respiratory distress. Breath sounds: Normal breath sounds. No stridor. No wheezing or rales. Chest:      Chest wall: No tenderness. Abdominal:      General: Bowel sounds are normal. There is no distension. Palpations: Abdomen is soft. Skin:     General: Skin is warm and dry. Capillary Refill: Capillary refill takes less than 2 seconds. Neurological:      Mental Status: He is alert and oriented to person, place, and time. Psychiatric:         Behavior: Behavior normal.         Thought Content: Thought content normal.         Judgment: Judgment normal.        Results   Lung Nodule Screening     [] Qualifies    [x] Does not qualify   [] Declined    [] Completed    7/22/2022   CT CHEST WO CONTRAST     FINDINGS:  Heart/mediastinum: The thyroid gland is unremarkable. The heart size is normal. Coronary artery calcifications are observed. No pericardial effusion is identified. The aorta is not dilated. No mediastinal, hilar, or axillary lymphadenopathy is observed. Lungs: A 9 mm bilobed right lower lobe pulmonary nodule is stable (series 2, image 43). A 3 mm left lower lobe pulmonary nodule is unchanged (series 2, image 53). No new pulmonary mass or nodule is observed. No focal consolidation, pleural effusion, or   pneumothorax is identified. The central airways are patent and unremarkable bilaterally. Upper abdomen: No acute findings are noted in the limited images through the upper abdomen. Musculoskeletal:  Diffuse osteopenia is present. The visualized skeletal structures appear intact. IMPRESSION:     No new or suspicious pulmonary mass or nodule. No acute intrathoracic process. Stable bilateral lower lobe pulmonary nodules unchanged when compared to examinations dating back to 2019. Assessment      Diagnosis Orders   1.  Right lower lobe pulmonary nodule  CT CHEST

## 2023-08-01 ENCOUNTER — OFFICE VISIT (OUTPATIENT)
Dept: PULMONOLOGY | Age: 68
End: 2023-08-01
Payer: MEDICARE

## 2023-08-01 VITALS
DIASTOLIC BLOOD PRESSURE: 64 MMHG | HEART RATE: 52 BPM | SYSTOLIC BLOOD PRESSURE: 120 MMHG | TEMPERATURE: 97.7 F | WEIGHT: 270.8 LBS | HEIGHT: 70 IN | OXYGEN SATURATION: 96 % | BODY MASS INDEX: 38.77 KG/M2

## 2023-08-01 DIAGNOSIS — R91.1 RIGHT LOWER LOBE PULMONARY NODULE: Primary | ICD-10-CM

## 2023-08-01 DIAGNOSIS — E66.9 OBESITY (BMI 30-39.9): ICD-10-CM

## 2023-08-01 PROCEDURE — 1036F TOBACCO NON-USER: CPT | Performed by: NURSE PRACTITIONER

## 2023-08-01 PROCEDURE — 99213 OFFICE O/P EST LOW 20 MIN: CPT | Performed by: NURSE PRACTITIONER

## 2023-08-01 PROCEDURE — 3074F SYST BP LT 130 MM HG: CPT | Performed by: NURSE PRACTITIONER

## 2023-08-01 PROCEDURE — G8427 DOCREV CUR MEDS BY ELIG CLIN: HCPCS | Performed by: NURSE PRACTITIONER

## 2023-08-01 PROCEDURE — 3078F DIAST BP <80 MM HG: CPT | Performed by: NURSE PRACTITIONER

## 2023-08-01 PROCEDURE — 3017F COLORECTAL CA SCREEN DOC REV: CPT | Performed by: NURSE PRACTITIONER

## 2023-08-01 PROCEDURE — G8417 CALC BMI ABV UP PARAM F/U: HCPCS | Performed by: NURSE PRACTITIONER

## 2023-08-01 PROCEDURE — 1123F ACP DISCUSS/DSCN MKR DOCD: CPT | Performed by: NURSE PRACTITIONER

## 2023-08-23 RX ORDER — HYDROCHLOROTHIAZIDE 25 MG/1
TABLET ORAL
Qty: 90 TABLET | Refills: 3 | Status: SHIPPED | OUTPATIENT
Start: 2023-08-23

## 2023-09-18 RX ORDER — LISINOPRIL 5 MG/1
5 TABLET ORAL DAILY
Qty: 90 TABLET | Refills: 3 | Status: SHIPPED | OUTPATIENT
Start: 2023-09-18

## 2024-01-29 ENCOUNTER — HOSPITAL ENCOUNTER (OUTPATIENT)
Dept: CT IMAGING | Age: 69
Discharge: HOME | End: 2024-01-29
Payer: MEDICARE

## 2024-01-29 DIAGNOSIS — R91.1 RIGHT LOWER LOBE PULMONARY NODULE: ICD-10-CM

## 2024-01-29 PROCEDURE — 71250 CT THORAX DX C-: CPT

## 2024-02-01 ENCOUNTER — OFFICE VISIT (OUTPATIENT)
Dept: PULMONOLOGY | Age: 69
End: 2024-02-01
Payer: MEDICARE

## 2024-02-01 VITALS
HEIGHT: 71 IN | BODY MASS INDEX: 38.81 KG/M2 | DIASTOLIC BLOOD PRESSURE: 62 MMHG | SYSTOLIC BLOOD PRESSURE: 122 MMHG | TEMPERATURE: 98.1 F | WEIGHT: 277.2 LBS | OXYGEN SATURATION: 94 % | HEART RATE: 53 BPM

## 2024-02-01 DIAGNOSIS — Z87.898 H/O MULTIPLE PULMONARY NODULES: ICD-10-CM

## 2024-02-01 DIAGNOSIS — R91.1 RIGHT LOWER LOBE PULMONARY NODULE: Primary | ICD-10-CM

## 2024-02-01 DIAGNOSIS — E66.9 OBESITY (BMI 30-39.9): ICD-10-CM

## 2024-02-01 PROCEDURE — G8427 DOCREV CUR MEDS BY ELIG CLIN: HCPCS | Performed by: NURSE PRACTITIONER

## 2024-02-01 PROCEDURE — 99213 OFFICE O/P EST LOW 20 MIN: CPT | Performed by: NURSE PRACTITIONER

## 2024-02-01 PROCEDURE — 1036F TOBACCO NON-USER: CPT | Performed by: NURSE PRACTITIONER

## 2024-02-01 PROCEDURE — 3078F DIAST BP <80 MM HG: CPT | Performed by: NURSE PRACTITIONER

## 2024-02-01 PROCEDURE — 1123F ACP DISCUSS/DSCN MKR DOCD: CPT | Performed by: NURSE PRACTITIONER

## 2024-02-01 PROCEDURE — 3017F COLORECTAL CA SCREEN DOC REV: CPT | Performed by: NURSE PRACTITIONER

## 2024-02-01 PROCEDURE — G8484 FLU IMMUNIZE NO ADMIN: HCPCS | Performed by: NURSE PRACTITIONER

## 2024-02-01 PROCEDURE — G8417 CALC BMI ABV UP PARAM F/U: HCPCS | Performed by: NURSE PRACTITIONER

## 2024-02-01 PROCEDURE — 3074F SYST BP LT 130 MM HG: CPT | Performed by: NURSE PRACTITIONER

## 2024-02-01 ASSESSMENT — ENCOUNTER SYMPTOMS
VOMITING: 0
WHEEZING: 0
NAUSEA: 0
CHEST TIGHTNESS: 0
DIARRHEA: 0
ALLERGIC/IMMUNOLOGIC NEGATIVE: 1
RESPIRATORY NEGATIVE: 1
GASTROINTESTINAL NEGATIVE: 1
EYES NEGATIVE: 1
STRIDOR: 0

## 2024-02-01 NOTE — PROGRESS NOTES
[facility]    Patient: SCOTT ROBERTS, 68 y.o.   : 1955  2024    Pt of Dr. Pierre     Subjective     Chief Complaint   Patient presents with    Follow-up     6 month f/u with ct chest-24        HPI  Scott is here for follow up for RLL nodule.    CT Chest done recently with stable findings and no acute issues seen   Never a smoker   BMI 38  Denies any pulmonary issues today in the office- I feel repeat imaging unceccecary nodule has been stable since 2019.   No new medical issues or concerns today     Progress History:   Since last visit any new medical issues? No  New ER or hospital visits? No  Any new or changes in medicines? No  Using inhalers? No  Are they helpful? NA    There is no immunization history on file for this patient.    Tobacco Use      Smoking status: Never      Smokeless tobacco: Never       Past Medical hx   PMH:  Past Medical History:   Diagnosis Date    Blood clot in vein     right leg     SURGICAL HISTORY:  Past Surgical History:   Procedure Laterality Date    CARDIAC SURGERY      CORONARY ARTERY BYPASS GRAFT N/A 3/8/2019    CABG CORONARY ARTERY BYPASS X 4 WITH PAULA performed by Silas [facility] MD at [facility] OR    HERNIA REPAIR      LIPOMA RESECTION      rt leg    UMBILICAL HERNIA REPAIR      VASCULAR SURGERY       SOCIAL HISTORY:  Social History     Tobacco Use    Smoking status: Never    Smokeless tobacco: Never   Vaping Use    Vaping Use: Never used   Substance Use Topics    Alcohol use: Yes     Comment: Occasional    Drug use: Never     ALLERGIES:No Known Allergies  FAMILY HISTORY:  Family History   Problem Relation Age of Onset    Hypertension Mother     Heart Surgery Father     Heart Attack Father     Hypertension Father     Heart Failure Brother      CURRENT MEDICATIONS:  Current Outpatient Medications   Medication Sig Dispense Refill    lisinopril (PRINIVIL;ZESTRIL) 5 MG tablet Take 1 tablet by mouth daily 90 tablet 3

## 2024-06-05 NOTE — PATIENT INSTRUCTIONS
Your Provider for Today: Dr. Hobson  Your nurses for today: Vu    You may receive a survey regarding the care you received during your visit.  Your input is valuable to us.  We encourage you to complete and return your survey.  We hope you will choose us in the future for your healthcare needs.

## 2024-06-07 ENCOUNTER — OFFICE VISIT (OUTPATIENT)
Dept: CARDIOLOGY CLINIC | Age: 69
End: 2024-06-07

## 2024-06-07 VITALS
HEIGHT: 70 IN | SYSTOLIC BLOOD PRESSURE: 125 MMHG | HEART RATE: 57 BPM | BODY MASS INDEX: 39.57 KG/M2 | DIASTOLIC BLOOD PRESSURE: 71 MMHG | WEIGHT: 276.4 LBS

## 2024-06-07 DIAGNOSIS — I25.10 CORONARY ARTERY DISEASE DUE TO LIPID RICH PLAQUE: Primary | ICD-10-CM

## 2024-06-07 DIAGNOSIS — I25.83 CORONARY ARTERY DISEASE DUE TO LIPID RICH PLAQUE: Primary | ICD-10-CM

## 2024-06-07 RX ORDER — ASPIRIN 81 MG/1
81 TABLET ORAL DAILY
Qty: 90 TABLET | Refills: 1 | Status: SHIPPED | OUTPATIENT
Start: 2024-06-07

## 2024-06-07 NOTE — PROGRESS NOTES
[facility] PROFESSIONAL [facility] CARDIOLOGY  [address]  [address]  [facility] [address]  Dept: [phone]  Dept Fax: [phone]  Loc: [phone]    Visit Date: 6/7/2024    Mr. Castillo is a 69 y.o. male  who presented for:  Chief Complaint   Patient presents with    1 Year Follow Up    Leg Swelling       HPI:   68 yo M c hx of CAD s/p CABG on 3/8/19 (LIMA to LAD, HOOD to OM, SVG on RCA),HTN, HLD, NEL on CPAP and Left DVT.   Denies any chest pain, sob, palpitations, lightheadedness, dizziness, orthopnea, PND or pedal edema.   Plays golf daily.          Current Outpatient Medications:     lisinopril (PRINIVIL;ZESTRIL) 5 MG tablet, Take 1 tablet by mouth daily, Disp: 90 tablet, Rfl: 3    hydroCHLOROthiazide (HYDRODIURIL) 25 MG tablet, TAKE 1 TABLET BY MOUTH EVERY DAY IN THE MORNING, Disp: 90 tablet, Rfl: 3    aspirin 325 MG EC tablet, TAKE 1 TABLET BY MOUTH EVERY DAY, Disp: 90 tablet, Rfl: 3    atorvastatin (LIPITOR) 40 MG tablet, Take 1 tablet by mouth daily, Disp: 90 tablet, Rfl: 3    celecoxib (CELEBREX) 200 MG capsule, Take 1 capsule by mouth daily, Disp: , Rfl:     multivitamin-iron-minerals-folic acid (CENTRUM) chewable tablet, Take 1 tablet by mouth daily, Disp: , Rfl:     nitroGLYCERIN (NITROSTAT) 0.4 MG SL tablet, up to max of 3 total doses. If no relief after 1 dose, call 911., Disp: 25 tablet, Rfl: 3    Past Medical History  Scott  has a past medical history of Blood clot in vein.    Social History  Scott  reports that he has never smoked. He has never used smokeless tobacco. He reports current alcohol use. He reports that he does not use drugs.    Family History  Scott family history includes Heart Attack in his father; Heart Failure in his brother; Heart Surgery in his father; Hypertension in his father and mother.    Past Surgical History   Past Surgical History:   Procedure Laterality Date    CARDIAC SURGERY      CORONARY ARTERY BYPASS GRAFT N/A 3/8/2019    CABG CORONARY

## 2024-07-18 RX ORDER — LISINOPRIL 5 MG/1
5 TABLET ORAL DAILY
Qty: 90 TABLET | Refills: 3 | Status: SHIPPED | OUTPATIENT
Start: 2024-07-18

## 2024-07-18 RX ORDER — ATORVASTATIN CALCIUM 40 MG/1
40 TABLET, FILM COATED ORAL DAILY
Qty: 90 TABLET | Refills: 3 | Status: SHIPPED | OUTPATIENT
Start: 2024-07-18

## 2024-10-31 RX ORDER — HYDROCHLOROTHIAZIDE 25 MG/1
TABLET ORAL
Qty: 90 TABLET | Refills: 3 | Status: SHIPPED | OUTPATIENT
Start: 2024-10-31

## 2025-04-21 ENCOUNTER — OFFICE VISIT (OUTPATIENT)
Age: 70
End: 2025-04-21
Payer: MEDICARE

## 2025-04-21 VITALS
BODY MASS INDEX: 41.39 KG/M2 | OXYGEN SATURATION: 95 % | DIASTOLIC BLOOD PRESSURE: 62 MMHG | WEIGHT: 289.1 LBS | TEMPERATURE: 98.1 F | HEART RATE: 61 BPM | SYSTOLIC BLOOD PRESSURE: 130 MMHG | HEIGHT: 70 IN

## 2025-04-21 DIAGNOSIS — E66.9 OBESITY (BMI 30-39.9): ICD-10-CM

## 2025-04-21 DIAGNOSIS — G47.33 OSA (OBSTRUCTIVE SLEEP APNEA): Primary | ICD-10-CM

## 2025-04-21 PROCEDURE — 1036F TOBACCO NON-USER: CPT

## 2025-04-21 PROCEDURE — 1123F ACP DISCUSS/DSCN MKR DOCD: CPT

## 2025-04-21 PROCEDURE — 3017F COLORECTAL CA SCREEN DOC REV: CPT

## 2025-04-21 PROCEDURE — 3075F SYST BP GE 130 - 139MM HG: CPT

## 2025-04-21 PROCEDURE — 99213 OFFICE O/P EST LOW 20 MIN: CPT

## 2025-04-21 PROCEDURE — 1159F MED LIST DOCD IN RCRD: CPT

## 2025-04-21 PROCEDURE — 3078F DIAST BP <80 MM HG: CPT

## 2025-04-21 PROCEDURE — G8417 CALC BMI ABV UP PARAM F/U: HCPCS

## 2025-04-21 PROCEDURE — 1160F RVW MEDS BY RX/DR IN RCRD: CPT

## 2025-04-21 PROCEDURE — G8427 DOCREV CUR MEDS BY ELIG CLIN: HCPCS

## 2025-04-21 ASSESSMENT — ENCOUNTER SYMPTOMS
RHINORRHEA: 0
CHEST TIGHTNESS: 0
SHORTNESS OF BREATH: 0
SORE THROAT: 0

## 2025-04-21 NOTE — PROGRESS NOTES
[facility]     SCOTT ROBERTS, 70 y.o.   : 1955  Day of encounter: 2025   Previously seen by Dr. Pierre, Vale Teixeira CNP     Subjective   Scott is here for 2 year follow up for NEL.   Scott presents to the sleep clinic today alone with no acute concerns or complaints. Reports no significant change in daytime sleepiness or drowsiness, no change in overall sleep quality in the last year. Denies CP, chest tightness, shortness of breath. Denies issues with mask or machine.     SAQLI: 80  Elton Sleepiness Scale: 6  Progress History:   Since last visit any new medical issues? No   New ER or hospitlal visits? No   Any new or changes in medicines? No     Initial AHI: 36.1 per study dated 2001     Past Medical hx   PMH:  Past Medical History:   Diagnosis Date    Blood clot in vein     right leg     SURGICAL HISTORY:  Past Surgical History:   Procedure Laterality Date    CARDIAC SURGERY      CORONARY ARTERY BYPASS GRAFT N/A 3/8/2019    CABG CORONARY ARTERY BYPASS X 4 WITH PAULA performed by Silas Savage MD at [facility] OR    HERNIA REPAIR      LIPOMA RESECTION      rt leg    UMBILICAL HERNIA REPAIR      VASCULAR SURGERY       SOCIAL HISTORY:  Social History     Tobacco Use    Smoking status: Never    Smokeless tobacco: Never   Vaping Use    Vaping status: Never Used   Substance Use Topics    Alcohol use: Yes     Comment: Occasional    Drug use: Never     ALLERGIES:No Known Allergies  FAMILY HISTORY:  Family History   Problem Relation Age of Onset    Hypertension Mother     Heart Surgery Father     Heart Attack Father     Hypertension Father     Heart Failure Brother      CURRENT MEDICATIONS:  Current Outpatient Medications   Medication Sig Dispense Refill    hydroCHLOROthiazide (HYDRODIURIL) 25 MG tablet TAKE 1 TABLET BY MOUTH EVERY DAY IN THE MORNING 90 tablet 3    atorvastatin (LIPITOR) 40 MG tablet TAKE 1 TABLET DAILY 90 tablet 3    lisinopril (PRINIVIL;ZESTRIL) 5

## 2025-06-20 ENCOUNTER — OFFICE VISIT (OUTPATIENT)
Dept: CARDIOLOGY CLINIC | Age: 70
End: 2025-06-20
Payer: MEDICARE

## 2025-06-20 VITALS
DIASTOLIC BLOOD PRESSURE: 72 MMHG | BODY MASS INDEX: 39.65 KG/M2 | HEART RATE: 54 BPM | SYSTOLIC BLOOD PRESSURE: 134 MMHG | WEIGHT: 277 LBS | HEIGHT: 70 IN

## 2025-06-20 DIAGNOSIS — R06.02 SHORTNESS OF BREATH: Primary | ICD-10-CM

## 2025-06-20 PROCEDURE — 3078F DIAST BP <80 MM HG: CPT | Performed by: INTERNAL MEDICINE

## 2025-06-20 PROCEDURE — 3017F COLORECTAL CA SCREEN DOC REV: CPT | Performed by: INTERNAL MEDICINE

## 2025-06-20 PROCEDURE — 3075F SYST BP GE 130 - 139MM HG: CPT | Performed by: INTERNAL MEDICINE

## 2025-06-20 PROCEDURE — 99214 OFFICE O/P EST MOD 30 MIN: CPT | Performed by: INTERNAL MEDICINE

## 2025-06-20 PROCEDURE — 1036F TOBACCO NON-USER: CPT | Performed by: INTERNAL MEDICINE

## 2025-06-20 PROCEDURE — 1123F ACP DISCUSS/DSCN MKR DOCD: CPT | Performed by: INTERNAL MEDICINE

## 2025-06-20 PROCEDURE — 1159F MED LIST DOCD IN RCRD: CPT | Performed by: INTERNAL MEDICINE

## 2025-06-20 PROCEDURE — G8427 DOCREV CUR MEDS BY ELIG CLIN: HCPCS | Performed by: INTERNAL MEDICINE

## 2025-06-20 PROCEDURE — 93000 ELECTROCARDIOGRAM COMPLETE: CPT | Performed by: INTERNAL MEDICINE

## 2025-06-20 PROCEDURE — G8417 CALC BMI ABV UP PARAM F/U: HCPCS | Performed by: INTERNAL MEDICINE

## 2025-06-20 RX ORDER — NITROGLYCERIN 0.4 MG/1
TABLET SUBLINGUAL
Qty: 25 TABLET | Refills: 3 | Status: SHIPPED | OUTPATIENT
Start: 2025-06-20

## 2025-06-20 RX ORDER — HYDROCHLOROTHIAZIDE 25 MG/1
25 TABLET ORAL EVERY MORNING
Qty: 90 TABLET | Refills: 3 | Status: SHIPPED | OUTPATIENT
Start: 2025-06-20

## 2025-06-20 RX ORDER — ATORVASTATIN CALCIUM 40 MG/1
40 TABLET, FILM COATED ORAL DAILY
Qty: 90 TABLET | Refills: 3 | Status: SHIPPED | OUTPATIENT
Start: 2025-06-20

## 2025-06-20 RX ORDER — LISINOPRIL 5 MG/1
5 TABLET ORAL DAILY
Qty: 90 TABLET | Refills: 3 | Status: SHIPPED | OUTPATIENT
Start: 2025-06-20

## 2025-06-20 RX ORDER — ASPIRIN 81 MG/1
81 TABLET ORAL DAILY
Qty: 90 TABLET | Refills: 3 | Status: SHIPPED | OUTPATIENT
Start: 2025-06-20

## 2025-06-20 NOTE — PROGRESS NOTES
[facility] [place] [facility]  [facility] CARDIOLOGY  [address]  [address]  [facility] [address]  Dept: [phone]  Dept Fax: [phone]  Loc: [phone]    Visit Date: 6/20/2025    Chief Complaint   Patient presents with    1 Year Follow Up       HPI:   Mr. Castillo is a 70 y.o. male  who presented for:   CAD s/p CABG on 3/8/19 (LIMA to LAD, HOOD to OM, SVG on RCA),HTN, HLD, NEL on CPAP and Left DVT.    History of Present Illness  The patient presents for evaluation of hypertension, hyperlipidemia, and sleep apnea.    He reports no recent health-related issues or upcoming surgical procedures. He is not experiencing chest pain or shortness of breath. His physical activity is not limited, as evidenced by his ability to walk 100 yards without difficulty. He also reports no back-related issues. Recently, he engaged in strenuous activity, shoveling 2 yards of mulch and flower beds, without any complications. He maintains an active lifestyle, playing golf 3 to 4 days a week.    He has a history of bypass surgery in 2019. Currently, he is on CPAP therapy, which he uses for 7 hours nightly. He is also on atorvastatin, lisinopril 5 mg, and hydrochlorothiazide.    He notes that the swelling in his legs is not severe today, although it can vary.    PAST SURGICAL HISTORY:  Bypass surgery in 2019    SOCIAL HISTORY  Exercise: Plays golf three or four days a week.      Current Outpatient Medications:     hydroCHLOROthiazide (HYDRODIURIL) 25 MG tablet, TAKE 1 TABLET BY MOUTH EVERY DAY IN THE MORNING, Disp: 90 tablet, Rfl: 3    atorvastatin (LIPITOR) 40 MG tablet, TAKE 1 TABLET DAILY, Disp: 90 tablet, Rfl: 3    lisinopril (PRINIVIL;ZESTRIL) 5 MG tablet, TAKE 1 TABLET DAILY, Disp: 90 tablet, Rfl: 3    aspirin 81 MG EC tablet, Take 1 tablet by mouth daily, Disp: 90 tablet, Rfl: 1    celecoxib (CELEBREX) 200 MG capsule, Take 1 capsule by mouth daily, Disp: , Rfl:     multivitamin-iron-minerals-folic 
EKG completed today  C/o BLE  Denies cp, sob, palpitations, headaches, dizziness, fatigue  
person, place, and time. Appears well-developed and well-nourished.   HENT:   Head: Normocephalic and atraumatic.   Eyes: EOM are normal. Pupils are equal, round, and reactive to light.   Neck: Normal range of motion. Neck supple. No JVD present.   Cardiovascular: Normal rate , normal heart sounds and intact distal pulses.    Pulmonary/Chest: Effort normal and breath sounds normal. No respiratory distress. No wheezes. No rales.   Abdominal: Soft. Bowel sounds are normal. No distension. There is no tenderness.   Musculoskeletal: Normal range of motion. No edema.   Neurological: Alert and oriented to person, place, and time. No cranial nerve deficit. Coordination normal.   Skin: Skin is warm and dry.   Psychiatric: Normal mood and affect.       No results found for: \"CKTOTAL\", \"CKMB\", \"CKMBINDEX\"    Lab Results   Component Value Date/Time    WBC 7.7 05/16/2025 10:23 AM    RBC 4.98 05/16/2025 10:23 AM    HGB 15.2 05/16/2025 10:23 AM    HCT 44.8 05/16/2025 10:23 AM    MCV 89.9 05/16/2025 10:23 AM    MCH 30.5 05/16/2025 10:23 AM    MCHC 33.9 05/16/2025 10:23 AM    RDW 14.4 05/16/2025 10:23 AM     05/16/2025 10:23 AM    MPV 9.9 04/05/2019 12:35 PM       Lab Results   Component Value Date/Time     05/16/2025 10:23 AM    K 4.0 05/16/2025 10:23 AM    K 4.5 03/26/2019 06:54 PM     05/16/2025 10:23 AM    CO2 27 05/16/2025 10:23 AM    BUN 15 05/16/2025 10:23 AM    CREATININE 0.95 05/16/2025 10:23 AM    CALCIUM 8.40 05/16/2025 10:23 AM    LABGLOM 67 04/05/2019 12:35 PM    GLUCOSE 119 05/16/2025 10:23 AM       Lab Results   Component Value Date/Time    ALKPHOS 97 05/16/2025 10:23 AM    ALT 19 05/16/2025 10:23 AM    AST 15 05/16/2025 10:23 AM    BILITOT 0.9 05/16/2025 10:23 AM    BILIDIR <0.2 03/05/2019 05:35 AM       Lab Results   Component Value Date/Time    MG 2.2 03/12/2019 05:21 AM       Lab Results   Component Value Date    INR 1.14 (H) 03/26/2019    INR 1.04 03/05/2019    INR 0.98 03/05/2019         Lab